# Patient Record
Sex: FEMALE | Race: WHITE | NOT HISPANIC OR LATINO | Employment: FULL TIME | URBAN - METROPOLITAN AREA
[De-identification: names, ages, dates, MRNs, and addresses within clinical notes are randomized per-mention and may not be internally consistent; named-entity substitution may affect disease eponyms.]

---

## 2017-08-04 ENCOUNTER — OFFICE VISIT (OUTPATIENT)
Dept: PRIMARY CARE CLINIC | Facility: CLINIC | Age: 37
End: 2017-08-04
Payer: MEDICAID

## 2017-08-04 VITALS
BODY MASS INDEX: 48.82 KG/M2 | WEIGHT: 293 LBS | OXYGEN SATURATION: 96 % | SYSTOLIC BLOOD PRESSURE: 163 MMHG | TEMPERATURE: 98 F | DIASTOLIC BLOOD PRESSURE: 93 MMHG | HEART RATE: 68 BPM | HEIGHT: 65 IN | RESPIRATION RATE: 18 BRPM

## 2017-08-04 DIAGNOSIS — S29.019A THORACIC MYOFASCIAL STRAIN, INITIAL ENCOUNTER: Primary | ICD-10-CM

## 2017-08-04 DIAGNOSIS — R81 GLYCOSURIA: ICD-10-CM

## 2017-08-04 DIAGNOSIS — M54.9 CVA TENDERNESS: ICD-10-CM

## 2017-08-04 PROCEDURE — 3008F BODY MASS INDEX DOCD: CPT | Mod: S$GLB,,, | Performed by: FAMILY MEDICINE

## 2017-08-04 PROCEDURE — 99203 OFFICE O/P NEW LOW 30 MIN: CPT | Mod: S$GLB,,, | Performed by: FAMILY MEDICINE

## 2017-08-04 RX ORDER — KETOCONAZOLE 20 MG/ML
1 SHAMPOO, SUSPENSION TOPICAL
Qty: 120 ML | Refills: 5 | Status: SHIPPED | OUTPATIENT
Start: 2017-08-04 | End: 2018-01-16 | Stop reason: SDUPTHER

## 2017-08-04 RX ORDER — METFORMIN HYDROCHLORIDE 1000 MG/1
1000 TABLET ORAL 2 TIMES DAILY WITH MEALS
Qty: 60 TABLET | Refills: 5 | Status: SHIPPED | OUTPATIENT
Start: 2017-08-04 | End: 2018-01-16 | Stop reason: SDUPTHER

## 2017-08-04 RX ORDER — METFORMIN HYDROCHLORIDE 1000 MG/1
1000 TABLET ORAL 2 TIMES DAILY WITH MEALS
COMMUNITY
End: 2017-08-04 | Stop reason: SDUPTHER

## 2017-08-04 RX ORDER — DICLOFENAC SODIUM 50 MG/1
50 TABLET, DELAYED RELEASE ORAL 2 TIMES DAILY
Qty: 20 TABLET | Refills: 0 | Status: SHIPPED | OUTPATIENT
Start: 2017-08-04 | End: 2017-08-14

## 2017-08-04 RX ORDER — KETOCONAZOLE 20 MG/ML
1 SHAMPOO, SUSPENSION TOPICAL
COMMUNITY
End: 2017-08-04 | Stop reason: SDUPTHER

## 2017-08-04 RX ORDER — GLIPIZIDE 5 MG/1
5 TABLET, FILM COATED, EXTENDED RELEASE ORAL
Qty: 30 TABLET | Refills: 5 | Status: SHIPPED | OUTPATIENT
Start: 2017-08-04 | End: 2018-01-16 | Stop reason: SDUPTHER

## 2017-08-04 RX ORDER — CYCLOBENZAPRINE HCL 10 MG
10 TABLET ORAL 3 TIMES DAILY PRN
Qty: 30 TABLET | Refills: 1 | Status: SHIPPED | OUTPATIENT
Start: 2017-08-04 | End: 2019-03-08

## 2017-08-04 RX ORDER — HYDROCODONE BITARTRATE AND ACETAMINOPHEN 5; 325 MG/1; MG/1
1 TABLET ORAL EVERY 6 HOURS PRN
Qty: 30 TABLET | Refills: 0 | Status: SHIPPED | OUTPATIENT
Start: 2017-08-04 | End: 2018-04-06

## 2017-08-04 RX ORDER — GLIPIZIDE 5 MG/1
5 TABLET, FILM COATED, EXTENDED RELEASE ORAL
COMMUNITY
End: 2017-08-04 | Stop reason: SDUPTHER

## 2017-08-04 RX ORDER — GABAPENTIN 300 MG/1
300 CAPSULE ORAL 2 TIMES DAILY
COMMUNITY
End: 2018-01-16 | Stop reason: SDUPTHER

## 2017-08-04 RX ORDER — LOSARTAN POTASSIUM AND HYDROCHLOROTHIAZIDE 12.5; 1 MG/1; MG/1
1 TABLET ORAL DAILY
COMMUNITY
End: 2017-08-04 | Stop reason: SDUPTHER

## 2017-08-04 RX ORDER — LOSARTAN POTASSIUM AND HYDROCHLOROTHIAZIDE 12.5; 1 MG/1; MG/1
1 TABLET ORAL DAILY
Qty: 30 TABLET | Refills: 5 | Status: SHIPPED | OUTPATIENT
Start: 2017-08-04 | End: 2018-01-16 | Stop reason: SDUPTHER

## 2017-08-04 RX ORDER — FUROSEMIDE 20 MG/1
20 TABLET ORAL DAILY PRN
COMMUNITY
End: 2018-01-16 | Stop reason: SDUPTHER

## 2017-08-04 NOTE — TELEPHONE ENCOUNTER
----- Message from Parish Ralph sent at 8/4/2017  2:38 PM CDT -----  Contact: same  Patient called in and stated she went to  her Rx's at her pharmacy and stated that 4 were not sent over:    Metformin 1000 mg  Losartan  Glipizide   Nizoral Shampoo (2%)      [facility] [address] [place] [address] [address] GARZA  [address] AT [facility] [place] [place] of [address]  [address] [address]  Phone: [phone] Fax: [phone]    Patient call back number is [phone]

## 2017-08-04 NOTE — PROGRESS NOTES
"Subjective:       Patient ID: Jessi Israel is a 37 y.o. female.    Chief Complaint: Back Pain (in the middle of pts back. pt states the pain feels like a "charley horse" went to ER, says nothing is wrong )    Back Pain   This is a new problem. The current episode started in the past 7 days. The problem occurs constantly. The problem has been gradually worsening since onset. The pain is present in the thoracic spine and costovertebral angle. The quality of the pain is described as shooting. Radiates to: left flank. The pain is at a severity of 8/10. The pain is severe. The symptoms are aggravated by position. Stiffness is present all day. Pertinent negatives include no chest pain, dysuria or fever. She has tried heat, ice, muscle relaxant and NSAIDs (Went to ER yesterday, had U/A and CT (negative) and shot of Toradol without relief) for the symptoms. The treatment provided no relief.     Review of Systems   Constitutional: Negative for chills and fever.   Respiratory: Negative for cough and shortness of breath.    Cardiovascular: Negative for chest pain.   Gastrointestinal: Positive for nausea. Negative for vomiting.   Genitourinary: Negative for difficulty urinating, dysuria and hematuria.   Musculoskeletal: Positive for back pain.       Objective:      Physical Exam   Constitutional: She is oriented to person, place, and time. She appears well-developed and well-nourished.   HENT:   Head: Normocephalic and atraumatic.   Nose: Nose normal.   Mouth/Throat: Mucous membranes are normal.   Eyes: EOM are normal. Pupils are equal, round, and reactive to light.   Neck: Neck supple. No JVD present.   Cardiovascular: Normal rate, regular rhythm, normal heart sounds and normal pulses.    Pulmonary/Chest: Effort normal and breath sounds normal.   Abdominal: Soft. Normal appearance and bowel sounds are normal. She exhibits no distension. There is CVA tenderness.   Left CVA tenderness   Musculoskeletal:        Thoracic back: " She exhibits tenderness and spasm. She exhibits no bony tenderness, no swelling and no deformity.        Back:    Neurological: She is alert and oriented to person, place, and time.   Skin: Skin is warm and dry.   Psychiatric: She has a normal mood and affect. Her speech is normal.   Vitals reviewed.      Assessment:       1. Thoracic myofascial strain, initial encounter    2. CVA tenderness    3. Glycosuria        Plan:       Thoracic myofascial strain, initial encounter  Comments:  [facility] ER records reviewed, renal stone protocol CT reviewed (no acute pathology). Rest, alternate ice & heat, use prn analgesics. F/U in 1 week if not improving.  Orders:  -     diclofenac (VOLTAREN) 50 MG EC tablet; Take 1 tablet (50 mg total) by mouth 2 (two) times daily.  Dispense: 20 tablet; Refill: 0  -     cyclobenzaprine (FLEXERIL) 10 MG tablet; Take 1 tablet (10 mg total) by mouth 3 (three) times daily as needed for Muscle spasms.  Dispense: 30 tablet; Refill: 1  -     hydrocodone-acetaminophen 5-325mg (NORCO) 5-325 mg per tablet; Take 1 tablet by mouth every 6 (six) hours as needed for Pain.  Dispense: 30 tablet; Refill: 0    CVA tenderness  Comments:   infection unlikely  Orders:  -     POCT urinalysis, dipstick or tablet reag  -     CULTURE, URINE    Glycosuria  -     CULTURE, URINE

## 2017-08-09 ENCOUNTER — TELEPHONE (OUTPATIENT)
Dept: PRIMARY CARE CLINIC | Facility: CLINIC | Age: 37
End: 2017-08-09

## 2017-08-09 DIAGNOSIS — N39.0 URINARY TRACT INFECTION DUE TO ENTEROCOCCUS: Primary | ICD-10-CM

## 2017-08-09 DIAGNOSIS — B95.2 URINARY TRACT INFECTION DUE TO ENTEROCOCCUS: Primary | ICD-10-CM

## 2017-08-09 RX ORDER — CIPROFLOXACIN 500 MG/1
500 TABLET ORAL 2 TIMES DAILY
Qty: 14 TABLET | Refills: 0 | Status: SHIPPED | OUTPATIENT
Start: 2017-08-09 | End: 2017-08-16

## 2017-08-10 NOTE — TELEPHONE ENCOUNTER
Notified pt urine culture came back with a bacteria (enterococcus) and Dr Alas send over an antibiotic. Pt states understanding

## 2018-01-09 ENCOUNTER — TELEPHONE (OUTPATIENT)
Dept: PRIMARY CARE CLINIC | Facility: CLINIC | Age: 38
End: 2018-01-09

## 2018-01-09 RX ORDER — FLUCONAZOLE 150 MG/1
150 TABLET ORAL ONCE
Qty: 1 TABLET | Refills: 0 | Status: SHIPPED | OUTPATIENT
Start: 2018-01-09 | End: 2018-01-09

## 2018-01-09 NOTE — TELEPHONE ENCOUNTER
----- Message from Mag Merchant sent at 1/9/2018  8:53 AM CST -----  Contact: self   Patient needs some diflucan called in for yeast infection       [id number] - [place] - [address] AT [facility] [place] of [address]  [address] [address]  Phone: [phone] Fax: [phone]

## 2018-01-16 ENCOUNTER — OFFICE VISIT (OUTPATIENT)
Dept: PRIMARY CARE CLINIC | Facility: CLINIC | Age: 38
End: 2018-01-16
Payer: MEDICAID

## 2018-01-16 ENCOUNTER — TELEPHONE (OUTPATIENT)
Dept: PRIMARY CARE CLINIC | Facility: CLINIC | Age: 38
End: 2018-01-16

## 2018-01-16 VITALS
TEMPERATURE: 98 F | HEIGHT: 65 IN | HEART RATE: 69 BPM | SYSTOLIC BLOOD PRESSURE: 141 MMHG | BODY MASS INDEX: 46.26 KG/M2 | DIASTOLIC BLOOD PRESSURE: 94 MMHG | OXYGEN SATURATION: 98 % | WEIGHT: 277.63 LBS | RESPIRATION RATE: 20 BRPM

## 2018-01-16 DIAGNOSIS — I10 ESSENTIAL HYPERTENSION: ICD-10-CM

## 2018-01-16 DIAGNOSIS — E11.9 TYPE 2 DIABETES MELLITUS WITHOUT COMPLICATION, WITHOUT LONG-TERM CURRENT USE OF INSULIN: ICD-10-CM

## 2018-01-16 DIAGNOSIS — R53.83 FATIGUE, UNSPECIFIED TYPE: ICD-10-CM

## 2018-01-16 DIAGNOSIS — J01.90 ACUTE BACTERIAL SINUSITIS: Primary | ICD-10-CM

## 2018-01-16 DIAGNOSIS — B96.89 ACUTE BACTERIAL SINUSITIS: Primary | ICD-10-CM

## 2018-01-16 DIAGNOSIS — Z13.220 LIPID SCREENING: ICD-10-CM

## 2018-01-16 PROCEDURE — 99213 OFFICE O/P EST LOW 20 MIN: CPT | Mod: PBBFAC,PN | Performed by: NURSE PRACTITIONER

## 2018-01-16 PROCEDURE — 99999 PR PBB SHADOW E&M-EST. PATIENT-LVL III: CPT | Mod: PBBFAC,,, | Performed by: NURSE PRACTITIONER

## 2018-01-16 PROCEDURE — 99214 OFFICE O/P EST MOD 30 MIN: CPT | Mod: S$PBB,,, | Performed by: NURSE PRACTITIONER

## 2018-01-16 RX ORDER — FUROSEMIDE 20 MG/1
20 TABLET ORAL DAILY PRN
Qty: 30 TABLET | Refills: 3 | Status: SHIPPED | OUTPATIENT
Start: 2018-01-16 | End: 2019-03-08

## 2018-01-16 RX ORDER — LOSARTAN POTASSIUM AND HYDROCHLOROTHIAZIDE 12.5; 1 MG/1; MG/1
1 TABLET ORAL DAILY
Qty: 30 TABLET | Refills: 3 | Status: SHIPPED | OUTPATIENT
Start: 2018-01-16 | End: 2019-03-08 | Stop reason: SDUPTHER

## 2018-01-16 RX ORDER — KETOCONAZOLE 20 MG/ML
1 SHAMPOO, SUSPENSION TOPICAL
Qty: 120 ML | Refills: 5 | Status: SHIPPED | OUTPATIENT
Start: 2018-01-17 | End: 2019-03-08

## 2018-01-16 RX ORDER — GABAPENTIN 300 MG/1
300 CAPSULE ORAL 2 TIMES DAILY
Qty: 60 CAPSULE | Refills: 3 | Status: SHIPPED | OUTPATIENT
Start: 2018-01-16 | End: 2018-04-06

## 2018-01-16 RX ORDER — GLIPIZIDE 5 MG/1
5 TABLET, FILM COATED, EXTENDED RELEASE ORAL
Qty: 30 TABLET | Refills: 3 | Status: SHIPPED | OUTPATIENT
Start: 2018-01-16 | End: 2019-03-08

## 2018-01-16 RX ORDER — METFORMIN HYDROCHLORIDE 1000 MG/1
1000 TABLET ORAL 2 TIMES DAILY WITH MEALS
Qty: 60 TABLET | Refills: 3 | Status: SHIPPED | OUTPATIENT
Start: 2018-01-16 | End: 2019-03-08

## 2018-01-16 RX ORDER — PROMETHAZINE HYDROCHLORIDE AND DEXTROMETHORPHAN HYDROBROMIDE 6.25; 15 MG/5ML; MG/5ML
5 SYRUP ORAL
Qty: 180 ML | Refills: 0 | Status: SHIPPED | OUTPATIENT
Start: 2018-01-16 | End: 2018-01-26

## 2018-01-16 RX ORDER — AMOXICILLIN AND CLAVULANATE POTASSIUM 875; 125 MG/1; MG/1
1 TABLET, FILM COATED ORAL EVERY 12 HOURS
Qty: 14 TABLET | Refills: 0 | Status: SHIPPED | OUTPATIENT
Start: 2018-01-16 | End: 2018-01-26

## 2018-01-16 NOTE — TELEPHONE ENCOUNTER
----- Message from Johnna Kathleen sent at 1/16/2018  9:44 AM CST -----  Contact: Patient  Jessi, patient cell [phone] or work [phone], Calling for same day appointment, chest congestion, feels like she can't catch a breath. Please advise. Thanks.

## 2018-01-16 NOTE — PROGRESS NOTES
Chief Complaint  Chief Complaint   Patient presents with    Cough    Sore Throat       HPI  Jessi Israel is a 37 y.o. female with medical diagnoses as listed within the medical history and problem list that presents for shortness of breath, cough, sore throat, congestion.  Patient is known to me with her last appointment. Patient reports the onset of symptoms 3 weeks ago with recent worsening. Fever and chills. Right-sided facial pain, right ear pain, right teeth pain. No n/v/d. Patient due for routine physical and lab work with dr. Alas. Reports she has been out of her medication for the past month and would like refills. Patient with h/o DM occasionally checking BG at home ranging 100-150s. Denies SE numbness, pain. Last eye exam 1 year ago. Patient with h/o HTN not currently on meds. Reports headaches and occasional blurred vision particularly when her pressure is high. Denies chest pain, palpitations.     PAST MEDICAL HISTORY:  Past Medical History:   Diagnosis Date    Arthritis     Diabetes mellitus, type 2     Hypertension        PAST SURGICAL HISTORY:  Past Surgical History:   Procedure Laterality Date    HERNIA REPAIR  2006    TONSILLECTOMY  1981    TUBAL LIGATION  2004       SOCIAL HISTORY:  Social History     Social History    Marital status: Single     Spouse name: N/A    Number of children: N/A    Years of education: N/A     Occupational History    Not on file.     Social History Main Topics    Smoking status: Current Every Day Smoker     Packs/day: 1.00     Types: Cigarettes    Smokeless tobacco: Never Used    Alcohol use Yes      Comment: socially    Drug use: No    Sexual activity: Not on file     Other Topics Concern    Not on file     Social History Narrative    No narrative on file       FAMILY HISTORY:  Family History   Problem Relation Age of Onset    Diabetes Mother     Hypertension Mother     Lupus Mother     Heart disease Mother     Hyperlipidemia Mother         ALLERGIES AND MEDICATIONS: updated and reviewed.  Review of patient's allergies indicates:  No Known Allergies  Current Outpatient Prescriptions   Medication Sig Dispense Refill    cyclobenzaprine (FLEXERIL) 10 MG tablet Take 1 tablet (10 mg total) by mouth 3 (three) times daily as needed for Muscle spasms. 30 tablet 1    furosemide (LASIX) 20 MG tablet Take 1 tablet (20 mg total) by mouth daily as needed. 30 tablet 3    glipiZIDE (GLUCOTROL) 5 MG TR24 Take 1 tablet (5 mg total) by mouth daily with breakfast. 30 tablet 3    [START ON 1/17/2018] ketoconazole (NIZORAL) 2 % shampoo Apply 1 application topically 3 (three) times a week. 120 mL 5    losartan-hydrochlorothiazide 100-12.5 mg (HYZAAR) 100-12.5 mg Tab Take 1 tablet by mouth once daily. 30 tablet 3    metFORMIN (GLUCOPHAGE) 1000 MG tablet Take 1 tablet (1,000 mg total) by mouth 2 (two) times daily with meals. 60 tablet 3    amoxicillin-clavulanate 875-125mg (AUGMENTIN) 875-125 mg per tablet Take 1 tablet by mouth every 12 (twelve) hours. 14 tablet 0    gabapentin (NEURONTIN) 300 MG capsule Take 1 capsule (300 mg total) by mouth 2 (two) times daily. 60 capsule 3    hydrocodone-acetaminophen 5-325mg (NORCO) 5-325 mg per tablet Take 1 tablet by mouth every 6 (six) hours as needed for Pain. 30 tablet 0    promethazine-dextromethorphan (PROMETHAZINE-DM) 6.25-15 mg/5 mL Syrp Take 5 mLs by mouth every 4 to 6 hours as needed. 180 mL 0     No current facility-administered medications for this visit.          ROS  Review of Systems   Constitutional: Positive for chills, fatigue and fever.   HENT: Positive for congestion, ear pain, postnasal drip, rhinorrhea, sinus pain, sinus pressure and sore throat.    Eyes: Positive for visual disturbance.   Respiratory: Positive for cough and shortness of breath. Negative for wheezing.    Cardiovascular: Negative for chest pain and palpitations.   Gastrointestinal: Positive for diarrhea. Negative for abdominal pain,  "nausea and vomiting.   Genitourinary: Negative for dysuria, frequency and urgency.   Musculoskeletal: Negative for arthralgias and joint swelling.   Skin: Negative for rash and wound.   Neurological: Positive for headaches. Negative for dizziness and weakness.   Psychiatric/Behavioral: Positive for sleep disturbance. Negative for dysphoric mood. The patient is not nervous/anxious.            PHYSICAL EXAM  Vitals:    01/16/18 1455   BP: (!) 141/94   BP Location: Right arm   Patient Position: Sitting   BP Method: Large (Automatic)   Pulse: 69   Resp: 20   Temp: 98 °F (36.7 °C)   TempSrc: Oral   SpO2: 98%   Weight: 125.9 kg (277 lb 9.6 oz)   Height: 5' 5" (1.651 m)    Body mass index is 46.2 kg/m².  Weight: 125.9 kg (277 lb 9.6 oz)   Height: 5' 5" (165.1 cm)       Physical Exam   Constitutional: She is oriented to person, place, and time. She appears well-developed and well-nourished.   HENT:   Head: Normocephalic.   Right Ear: Tympanic membrane normal.   Left Ear: Tympanic membrane normal.   Nose: Right sinus exhibits frontal sinus tenderness. Left sinus exhibits frontal sinus tenderness.   Mouth/Throat: Uvula is midline, oropharynx is clear and moist and mucous membranes are normal. Tonsils are 0 on the right. Tonsils are 0 on the left.   Eyes: Conjunctivae are normal.   Cardiovascular: Normal rate, regular rhythm, normal heart sounds and normal pulses.    No murmur heard.  Pulses:       Radial pulses are 2+ on the right side, and 2+ on the left side.   Pulmonary/Chest: Effort normal and breath sounds normal. She has no wheezes.   Abdominal: Soft. Bowel sounds are normal. There is no tenderness.   Musculoskeletal: She exhibits no edema.   Lymphadenopathy:     She has no cervical adenopathy.   Neurological: She is alert and oriented to person, place, and time.   Skin: Skin is warm and dry. No rash noted.   Psychiatric: She has a normal mood and affect.         Health Maintenance       Date Due Completion Date    " Lipid Panel 1978 ---    Hemoglobin A1c 1978 ---    Foot Exam 07/08/1988 ---    Eye Exam 07/08/1988 ---    TETANUS VACCINE 07/08/1996 ---    Pneumococcal PPSV23 (Medium Risk) (1) 07/08/1996 ---    Pap Smear with HPV Cotest 07/08/1999 ---    Influenza Vaccine 08/01/2017 ---               Assessment & Plan    Jessi was seen today for cough and sore throat.    Diagnoses and all orders for this visit:    Acute bacterial sinusitis  -     amoxicillin-clavulanate 875-125mg (AUGMENTIN) 875-125 mg per tablet; Take 1 tablet by mouth every 12 (twelve) hours.    Essential hypertension  -     losartan-hydrochlorothiazide 100-12.5 mg (HYZAAR) 100-12.5 mg Tab; Take 1 tablet by mouth once daily.  -     furosemide (LASIX) 20 MG tablet; Take 1 tablet (20 mg total) by mouth daily as needed.  -     CBC auto differential; Future  -     Comprehensive metabolic panel; Future    Type 2 diabetes mellitus without complication, without long-term current use of insulin  -     metFORMIN (GLUCOPHAGE) 1000 MG tablet; Take 1 tablet (1,000 mg total) by mouth 2 (two) times daily with meals.  -     glipiZIDE (GLUCOTROL) 5 MG TR24; Take 1 tablet (5 mg total) by mouth daily with breakfast.  -     Hemoglobin A1c; Future  -     Microalbumin/creatinine urine ratio; Future  -     TSH; Future  -     T4, free; Future  -     T3; Future    Fatigue, unspecified type    Lipid screening  -     Lipid panel; Future    Other orders  -     ketoconazole (NIZORAL) 2 % shampoo; Apply 1 application topically 3 (three) times a week.  -     gabapentin (NEURONTIN) 300 MG capsule; Take 1 capsule (300 mg total) by mouth 2 (two) times daily.  -     promethazine-dextromethorphan (PROMETHAZINE-DM) 6.25-15 mg/5 mL Syrp; Take 5 mLs by mouth every 4 to 6 hours as needed.          Follow-up: Follow-up if symptoms worsen or fail to improve, for 3 months for lab work / Dr. Alas.

## 2018-01-29 ENCOUNTER — TELEPHONE (OUTPATIENT)
Dept: PRIMARY CARE CLINIC | Facility: CLINIC | Age: 38
End: 2018-01-29

## 2018-01-29 RX ORDER — FLUCONAZOLE 150 MG/1
150 TABLET ORAL ONCE
Qty: 1 TABLET | Refills: 0 | Status: SHIPPED | OUTPATIENT
Start: 2018-01-29 | End: 2018-01-29

## 2018-01-29 NOTE — TELEPHONE ENCOUNTER
----- Message from Blanca Amezquita sent at 1/29/2018  3:13 PM CST -----  Contact: Patient  Patient states that she would like a prescription called in for her.  The medication is Diflucan for her yeast infection because of the antibiotics that she is taking.  Can you please call the patient back at [phone].  Thank you      [email or web address] [address] [place] [address] [address]AR[address] [facility] [address] AT [facility] of [address]  [place]  [address] [address]  Phone: [phone] Fax: [phone]

## 2018-04-06 ENCOUNTER — OFFICE VISIT (OUTPATIENT)
Dept: PRIMARY CARE CLINIC | Facility: CLINIC | Age: 38
End: 2018-04-06
Payer: MEDICAID

## 2018-04-06 VITALS
SYSTOLIC BLOOD PRESSURE: 145 MMHG | DIASTOLIC BLOOD PRESSURE: 81 MMHG | HEART RATE: 84 BPM | OXYGEN SATURATION: 95 % | HEIGHT: 65 IN | RESPIRATION RATE: 18 BRPM | TEMPERATURE: 98 F | WEIGHT: 293 LBS | BODY MASS INDEX: 48.82 KG/M2

## 2018-04-06 DIAGNOSIS — K52.9 GASTROENTERITIS: Primary | ICD-10-CM

## 2018-04-06 PROBLEM — E66.01 MORBID OBESITY WITH BMI OF 50.0-59.9, ADULT: Status: ACTIVE | Noted: 2018-04-06

## 2018-04-06 PROCEDURE — 99999 PR PBB SHADOW E&M-EST. PATIENT-LVL III: CPT | Mod: PBBFAC,,, | Performed by: FAMILY MEDICINE

## 2018-04-06 PROCEDURE — 99213 OFFICE O/P EST LOW 20 MIN: CPT | Mod: PBBFAC,PN | Performed by: FAMILY MEDICINE

## 2018-04-06 PROCEDURE — 99213 OFFICE O/P EST LOW 20 MIN: CPT | Mod: S$PBB,,, | Performed by: FAMILY MEDICINE

## 2018-04-06 RX ORDER — ONDANSETRON 4 MG/1
8 TABLET, ORALLY DISINTEGRATING ORAL EVERY 6 HOURS PRN
Qty: 20 TABLET | Refills: 1 | Status: SHIPPED | OUTPATIENT
Start: 2018-04-06 | End: 2019-03-08

## 2018-04-06 NOTE — PROGRESS NOTES
"Subjective:       Patient ID: Jessi Israel is a 37 y.o. female.    Chief Complaint: Nausea (x3 days) and Abdominal Cramping (x3 days)    Crampy abdominal pain, nausea, vomiting and nonbloody diarrhea x 3 days. Low-grade fever first day, none since. No longer vomiting, but still nauseated and having diarrhea. No known sick contacts      Review of Systems   Constitutional: Positive for fever.   Respiratory: Negative for shortness of breath.    Cardiovascular: Negative for chest pain.   Gastrointestinal: Positive for diarrhea, nausea and vomiting. Negative for blood in stool.       Objective:      Vitals:    04/06/18 1507   BP: (!) 145/81   BP Location: Left arm   Patient Position: Sitting   BP Method: Large (Automatic)   Pulse: 84   Resp: 18   Temp: 98.3 °F (36.8 °C)   TempSrc: Oral   SpO2: 95%   Weight: (!) 146.5 kg (323 lb)   Height: 5' 5" (1.651 m)     Physical Exam   Constitutional: She is oriented to person, place, and time. She appears well-developed and well-nourished.   HENT:   Head: Normocephalic and atraumatic.   Mouth/Throat: Oropharynx is clear and moist and mucous membranes are normal.   Neck: Neck supple. No JVD present.   Cardiovascular: Normal rate, regular rhythm and normal heart sounds.    Pulmonary/Chest: Effort normal and breath sounds normal.   Abdominal: Soft. Bowel sounds are normal. She exhibits no distension and no mass. There is tenderness in the epigastric area. There is no guarding.   Musculoskeletal: She exhibits no edema.   Neurological: She is alert and oriented to person, place, and time.   Skin: Skin is warm and dry.   Nursing note and vitals reviewed.      Assessment:       1. Gastroenteritis        Plan:       Gastroenteritis  Comments:  likely viral, treat symptomatically  Orders:  -     ranitidine (ZANTAC) 150 MG tablet; Take 1 tablet (150 mg total) by mouth 2 (two) times daily.  Dispense: 14 tablet; Refill: 0  -     ondansetron (ZOFRAN ODT) 4 MG TbDL; Take 2 tablets (8 mg " total) by mouth every 6 (six) hours as needed (nausea).  Dispense: 20 tablet; Refill: 1      Medication List with Changes/Refills   New Medications    ONDANSETRON (ZOFRAN ODT) 4 MG TBDL    Take 2 tablets (8 mg total) by mouth every 6 (six) hours as needed (nausea).    RANITIDINE (ZANTAC) 150 MG TABLET    Take 1 tablet (150 mg total) by mouth 2 (two) times daily.   Current Medications    CYCLOBENZAPRINE (FLEXERIL) 10 MG TABLET    Take 1 tablet (10 mg total) by mouth 3 (three) times daily as needed for Muscle spasms.    FUROSEMIDE (LASIX) 20 MG TABLET    Take 1 tablet (20 mg total) by mouth daily as needed.    GLIPIZIDE (GLUCOTROL) 5 MG TR24    Take 1 tablet (5 mg total) by mouth daily with breakfast.    KETOCONAZOLE (NIZORAL) 2 % SHAMPOO    Apply 1 application topically 3 (three) times a week.    LOSARTAN-HYDROCHLOROTHIAZIDE 100-12.5 MG (HYZAAR) 100-12.5 MG TAB    Take 1 tablet by mouth once daily.    METFORMIN (GLUCOPHAGE) 1000 MG TABLET    Take 1 tablet (1,000 mg total) by mouth 2 (two) times daily with meals.   Discontinued Medications    GABAPENTIN (NEURONTIN) 300 MG CAPSULE    Take 1 capsule (300 mg total) by mouth 2 (two) times daily.    HYDROCODONE-ACETAMINOPHEN 5-325MG (NORCO) 5-325 MG PER TABLET    Take 1 tablet by mouth every 6 (six) hours as needed for Pain.

## 2018-07-19 ENCOUNTER — TELEPHONE (OUTPATIENT)
Dept: PRIMARY CARE CLINIC | Facility: CLINIC | Age: 38
End: 2018-07-19

## 2018-07-19 NOTE — TELEPHONE ENCOUNTER
----- Message from Gaby Carbone sent at 7/19/2018 12:03 PM CDT -----  Contact: Patient  Type: Needs Medical Advice    Who Called:  Patient  Symptoms (please be specific):  Vomiting and diarrhea  How long has patient had these symptoms:  Since yesterday  Pharmacy name and phone #:    [facility] [id number] - [place] - [address] AT [facility] [address]  [address] [address]  Phone: [phone] Fax: [phone]  Best Call Back Number: [phone]  Additional Information:

## 2018-07-19 NOTE — TELEPHONE ENCOUNTER
patient notified that Dr. Alas is out of the office. She says she will try   OTC meds first and call back if she needs us.

## 2019-02-22 DIAGNOSIS — Z12.39 BREAST CANCER SCREENING: ICD-10-CM

## 2019-02-26 ENCOUNTER — TELEPHONE (OUTPATIENT)
Dept: PRIMARY CARE CLINIC | Facility: CLINIC | Age: 39
End: 2019-02-26

## 2019-02-26 NOTE — TELEPHONE ENCOUNTER
----- Message from Sherrill Sims sent at 2/26/2019  4:46 PM CST -----  Contact: Jessi  Type:  Same Day Appointment Request    Caller is requesting a same day appointment.  Caller declined first available appointment listed below.      Name of Caller:  patient  When is the first available appointment?  3/18/19  Symptoms:  Cough/congestion/runny nose/ears stopped up  Best Call Back Number:  [phone]  Additional Information:   Patient would like to be seen as soon as possible--please advise--thank you

## 2019-02-26 NOTE — TELEPHONE ENCOUNTER
Spoke with patient states she needs to be seen soon for being sick. Advised her the first available is with Dr. Ko and that is on 3/11. States she needs to be seen asap because she can not go back to work if she has the flu. Advised her she can go to an urgent care or ER. States she will figure something out.

## 2019-03-11 ENCOUNTER — TELEPHONE (OUTPATIENT)
Dept: PRIMARY CARE CLINIC | Facility: CLINIC | Age: 39
End: 2019-03-11

## 2019-03-11 DIAGNOSIS — K64.9 HEMORRHOIDS, UNSPECIFIED HEMORRHOID TYPE: Primary | ICD-10-CM

## 2019-03-11 NOTE — TELEPHONE ENCOUNTER
----- Message from Meghna Mcdowell sent at 3/11/2019 12:46 PM CDT -----  Contact: self  Patient went to [facility] Emergency Room on 03 08 19 for hemorrhoids and she was advised to schedule an appt with Dr Sagar Martin for consult and possible surgery of the hemorrhoids/please enter a referral for patient to see Dr Martin/this is a Medicaid referral

## 2019-07-17 ENCOUNTER — OFFICE VISIT (OUTPATIENT)
Dept: PRIMARY CARE CLINIC | Facility: CLINIC | Age: 39
End: 2019-07-17
Payer: MEDICAID

## 2019-07-17 VITALS
HEIGHT: 65 IN | HEART RATE: 64 BPM | TEMPERATURE: 98 F | DIASTOLIC BLOOD PRESSURE: 108 MMHG | BODY MASS INDEX: 48.82 KG/M2 | RESPIRATION RATE: 18 BRPM | OXYGEN SATURATION: 97 % | WEIGHT: 293 LBS | SYSTOLIC BLOOD PRESSURE: 188 MMHG

## 2019-07-17 DIAGNOSIS — F17.210 CIGARETTE NICOTINE DEPENDENCE WITHOUT COMPLICATION: ICD-10-CM

## 2019-07-17 DIAGNOSIS — L21.0 DANDRUFF IN ADULT: ICD-10-CM

## 2019-07-17 DIAGNOSIS — Z13.6 ENCOUNTER FOR SCREENING FOR CARDIOVASCULAR DISORDERS: ICD-10-CM

## 2019-07-17 DIAGNOSIS — E66.01 MORBID OBESITY WITH BMI OF 50.0-59.9, ADULT: ICD-10-CM

## 2019-07-17 DIAGNOSIS — Z12.4 CERVICAL CANCER SCREENING: ICD-10-CM

## 2019-07-17 DIAGNOSIS — I10 ESSENTIAL HYPERTENSION: Primary | ICD-10-CM

## 2019-07-17 DIAGNOSIS — E11.65 TYPE 2 DIABETES MELLITUS WITH HYPERGLYCEMIA, WITHOUT LONG-TERM CURRENT USE OF INSULIN: ICD-10-CM

## 2019-07-17 PROBLEM — R73.03 PREDIABETES: Status: ACTIVE | Noted: 2018-01-16

## 2019-07-17 PROBLEM — Z86.39 HISTORY OF DIABETES MELLITUS: Status: ACTIVE | Noted: 2019-07-17

## 2019-07-17 LAB — GLUCOSE SERPL-MCNC: 383 MG/DL (ref 70–110)

## 2019-07-17 PROCEDURE — 99214 PR OFFICE/OUTPT VISIT, EST, LEVL IV, 30-39 MIN: ICD-10-PCS | Mod: S$PBB,,, | Performed by: FAMILY MEDICINE

## 2019-07-17 PROCEDURE — 99214 OFFICE O/P EST MOD 30 MIN: CPT | Mod: S$PBB,,, | Performed by: FAMILY MEDICINE

## 2019-07-17 PROCEDURE — 99999 PR PBB SHADOW E&M-EST. PATIENT-LVL V: CPT | Mod: PBBFAC,,, | Performed by: FAMILY MEDICINE

## 2019-07-17 PROCEDURE — 93005 ELECTROCARDIOGRAM TRACING: CPT | Mod: PBBFAC,PN | Performed by: INTERNAL MEDICINE

## 2019-07-17 PROCEDURE — 93010 EKG 12-LEAD: ICD-10-PCS | Mod: S$PBB,,, | Performed by: INTERNAL MEDICINE

## 2019-07-17 PROCEDURE — 93010 ELECTROCARDIOGRAM REPORT: CPT | Mod: S$PBB,,, | Performed by: INTERNAL MEDICINE

## 2019-07-17 PROCEDURE — 99215 OFFICE O/P EST HI 40 MIN: CPT | Mod: PBBFAC,PN,25 | Performed by: FAMILY MEDICINE

## 2019-07-17 PROCEDURE — 82962 GLUCOSE BLOOD TEST: CPT | Mod: PBBFAC,PN | Performed by: FAMILY MEDICINE

## 2019-07-17 PROCEDURE — 99999 PR PBB SHADOW E&M-EST. PATIENT-LVL V: ICD-10-PCS | Mod: PBBFAC,,, | Performed by: FAMILY MEDICINE

## 2019-07-17 RX ORDER — METFORMIN HYDROCHLORIDE 500 MG/1
1000 TABLET, EXTENDED RELEASE ORAL
Qty: 60 TABLET | Refills: 5 | Status: SHIPPED | OUTPATIENT
Start: 2019-07-17 | End: 2020-02-04 | Stop reason: CLARIF

## 2019-07-17 RX ORDER — LOSARTAN POTASSIUM AND HYDROCHLOROTHIAZIDE 12.5; 1 MG/1; MG/1
1 TABLET ORAL DAILY
Qty: 30 TABLET | Refills: 5 | Status: SHIPPED | OUTPATIENT
Start: 2019-07-17 | End: 2020-02-04 | Stop reason: CLARIF

## 2019-07-17 RX ORDER — KETOCONAZOLE 20 MG/ML
SHAMPOO, SUSPENSION TOPICAL
Qty: 120 ML | Refills: 1 | Status: SHIPPED | OUTPATIENT
Start: 2019-07-18 | End: 2020-02-04 | Stop reason: CLARIF

## 2019-07-17 NOTE — PROGRESS NOTES
"Subjective:       Patient ID: Jessi Israel is a 39 y.o. female.    Chief Complaint: Hypertension    Ran out of blood pressure medications a few months ago, has not been taking.  Has been having elevated blood pressure, occasional nonexertional chest tightness, frequent headaches.  Also been having increased urinary frequency, no dysuria.  Has a history of diabetes, says she has been off metformin for years, was no longer diabetic for the past few years.  Has not been checking her blood sugar or following any particular diet.  Also complains of some left breast tenderness for the past few weeks, thought she may be palpated a mass, but not entirely sure.  No nipple discharge, skin changes, etc.  No prior mammograms.    Review of Systems   Constitutional: Negative for chills, fatigue and fever.   HENT: Negative for congestion.    Eyes: Negative for visual disturbance.   Respiratory: Positive for chest tightness. Negative for cough and shortness of breath.    Cardiovascular: Negative for chest pain and palpitations.   Gastrointestinal: Negative for abdominal pain, nausea and vomiting.   Endocrine: Positive for polyuria.   Genitourinary: Negative for difficulty urinating and dysuria.   Musculoskeletal: Negative for arthralgias.   Skin: Negative for rash.   Neurological: Positive for headaches. Negative for dizziness.   Psychiatric/Behavioral: Negative for sleep disturbance.       Objective:      Vitals:    07/17/19 1530 07/17/19 1549   BP: (!) 191/120 (!) 188/108   BP Location: Right arm Right arm   Patient Position: Sitting Sitting   BP Method: Large (Automatic) Large (Manual)   Pulse: 64    Resp: 18    Temp: 98.4 °F (36.9 °C)    TempSrc: Oral    SpO2: 97%    Weight: (!) 145.6 kg (321 lb)    Height: 5' 5" (1.651 m)      Physical Exam   Constitutional: She is oriented to person, place, and time. She appears well-developed and well-nourished.   HENT:   Head: Normocephalic and atraumatic.   Eyes: Pupils are equal, " round, and reactive to light. EOM are normal.   Neck: Neck supple. No JVD present. Carotid bruit is not present.   Cardiovascular: Normal rate, regular rhythm and normal heart sounds.   Pulses:       Radial pulses are 2+ on the right side, and 2+ on the left side.   Pulmonary/Chest: Effort normal and breath sounds normal. Right breast exhibits no mass, no skin change and no tenderness. Left breast exhibits no mass, no skin change and no tenderness. No breast swelling, tenderness or discharge. Breasts are symmetrical.   Abdominal: Soft. Bowel sounds are normal. She exhibits no distension. There is no tenderness.   Musculoskeletal: She exhibits no edema.   Neurological: She is alert and oriented to person, place, and time.   Skin: Skin is warm and dry.   Psychiatric: She has a normal mood and affect. Her behavior is normal.   Nursing note and vitals reviewed.      Assessment:       1. Essential hypertension    2. Cervical cancer screening    3. Morbid obesity with BMI of 50.0-59.9, adult    4. Cigarette nicotine dependence without complication    5. Type 2 diabetes mellitus with hyperglycemia, without long-term current use of insulin    6. Encounter for screening for cardiovascular disorders    7. Seborrhea capitis        Plan:       Essential hypertension  -     EKG 12-lead  -     CBC auto differential; Future; Expected date: 07/17/2019  -     Comprehensive metabolic panel; Future; Expected date: 07/17/2019  -     losartan-hydrochlorothiazide 100-12.5 mg (HYZAAR) 100-12.5 mg Tab; Take 1 tablet by mouth once daily.  Dispense: 30 tablet; Refill: 5  EKG normal.  Restart antihypertensives, needs close follow-up  Cervical cancer screening  -     Ambulatory Referral to Gynecology    Morbid obesity with BMI of 50.0-59.9, adult  Low carb diet and exercise  Cigarette nicotine dependence without complication  -     Ambulatory referral to Smoking Cessation Program    Type 2 diabetes mellitus with hyperglycemia, without  long-term current use of insulin  -     POCT Glucose, Hand-Held Device  -     Hemoglobin A1c; Future; Expected date: 07/17/2019  -     TSH; Future; Expected date: 07/17/2019  -     Microalbumin/creatinine urine ratio; Future; Expected date: 07/17/2019  -     Ambulatory Referral to Diabetes Education  -     metFORMIN (GLUCOPHAGE-XR) 500 MG 24 hr tablet; Take 2 tablets (1,000 mg total) by mouth daily with breakfast.  Dispense: 60 tablet; Refill: 5  Start metformin, needs to follow up with diabetes educator.  Needs baseline labs  Encounter for screening for cardiovascular disorders  -     Lipid panel; Future; Expected date: 07/17/2019    Seborrhea capitis  -     ketoconazole (NIZORAL) 2 % shampoo; Apply topically twice a week.  Dispense: 120 mL; Refill: 1      Medication List with Changes/Refills   New Medications    KETOCONAZOLE (NIZORAL) 2 % SHAMPOO    Apply topically twice a week.    METFORMIN (GLUCOPHAGE-XR) 500 MG 24 HR TABLET    Take 2 tablets (1,000 mg total) by mouth daily with breakfast.   Changed and/or Refilled Medications    Modified Medication Previous Medication    LOSARTAN-HYDROCHLOROTHIAZIDE 100-12.5 MG (HYZAAR) 100-12.5 MG TAB losartan-hydrochlorothiazide 100-12.5 mg (HYZAAR) 100-12.5 mg Tab       Take 1 tablet by mouth once daily.    Take 1 tablet by mouth once daily.   Discontinued Medications    DOCUSATE SODIUM (COLACE) 100 MG CAPSULE    Take 1 capsule (100 mg total) by mouth 3 (three) times daily as needed for Constipation.    HYDROCORTISONE 1 % CREAM    Apply to affected area 2 times daily    ONDANSETRON (ZOFRAN) 4 MG TABLET    Take 1 tablet (4 mg total) by mouth every 6 (six) hours.

## 2019-10-03 ENCOUNTER — TELEPHONE (OUTPATIENT)
Dept: PRIMARY CARE CLINIC | Facility: CLINIC | Age: 39
End: 2019-10-03

## 2019-10-03 NOTE — TELEPHONE ENCOUNTER
Patient is long overdue for follow-up for diabetes.  Needs fasting labs and follow-up appointment.  Please call patient and schedule

## 2020-02-10 ENCOUNTER — PATIENT OUTREACH (OUTPATIENT)
Dept: ADMINISTRATIVE | Facility: HOSPITAL | Age: 40
End: 2020-02-10

## 2020-02-11 NOTE — PROGRESS NOTES
Attempted to contact patient to discuss/schedule follow up appointment and overdue HM.  Unable to LMOM d/t phone busy x 2 attempts.  Patient not active on portal for alternative outreach.

## 2020-04-22 ENCOUNTER — TELEPHONE (OUTPATIENT)
Dept: PRIMARY CARE CLINIC | Facility: CLINIC | Age: 40
End: 2020-04-22

## 2020-04-22 DIAGNOSIS — F17.210 CIGARETTE NICOTINE DEPENDENCE WITHOUT COMPLICATION: Primary | ICD-10-CM

## 2020-04-22 NOTE — TELEPHONE ENCOUNTER
----- Message from Rebecca Osman sent at 4/22/2020 11:36 AM CDT -----  Contact: Pt [phone]  Is this a refill or new RX:  Refill    RX name and strength: Nicotine TD 21mg patch     Pharmacy name and phone # [id number] - [place], [place] - [address] AT [facility] [address] [phone] (Phone)  [phone] (Fax)

## 2020-04-22 NOTE — TELEPHONE ENCOUNTER
patient says the smoking cessation program is not seeing patient right now. She is asking if you can send a rx for patches to the pharmacy

## 2020-04-23 NOTE — TELEPHONE ENCOUNTER
I spoke to Juan, he is doing audio visits.  Please sign referral. patient was notified they would be called, she verbalized understanding.

## 2020-05-14 DIAGNOSIS — Z12.39 BREAST CANCER SCREENING: ICD-10-CM

## 2021-12-06 PROBLEM — Z71.3 ENCOUNTER FOR DIETARY CONSULTATION: Status: ACTIVE | Noted: 2021-12-06

## 2021-12-06 PROBLEM — R55 SYNCOPE: Status: ACTIVE | Noted: 2021-12-06

## 2022-05-17 ENCOUNTER — PATIENT OUTREACH (OUTPATIENT)
Dept: ADMINISTRATIVE | Facility: HOSPITAL | Age: 42
End: 2022-05-17
Payer: MEDICAID

## 2022-05-17 NOTE — PROGRESS NOTES
Chart review done.  updated. Immunizations reviewed & updated. Care Everywhere updated.  I called the patient to see if she is still using Dr. Alas as her PCP and to scheduled her a annual. No answer l/m.

## 2023-07-16 ENCOUNTER — HOSPITAL ENCOUNTER (EMERGENCY)
Facility: HOSPITAL | Age: 43
Discharge: HOME | End: 2023-07-16
Attending: EMERGENCY MEDICINE
Payer: MEDICAID

## 2023-07-16 VITALS
OXYGEN SATURATION: 99 % | SYSTOLIC BLOOD PRESSURE: 189 MMHG | HEART RATE: 82 BPM | WEIGHT: 250 LBS | RESPIRATION RATE: 71 BRPM | DIASTOLIC BLOOD PRESSURE: 90 MMHG | BODY MASS INDEX: 41.6 KG/M2 | TEMPERATURE: 98 F

## 2023-07-16 DIAGNOSIS — R59.0 CERVICAL LYMPHADENOPATHY: Primary | ICD-10-CM

## 2023-07-16 DIAGNOSIS — E11.65 HYPERGLYCEMIA DUE TO DIABETES MELLITUS: ICD-10-CM

## 2023-07-16 DIAGNOSIS — E11.65 TYPE 2 DIABETES MELLITUS WITH HYPERGLYCEMIA, WITHOUT LONG-TERM CURRENT USE OF INSULIN: ICD-10-CM

## 2023-07-16 DIAGNOSIS — I10 POORLY-CONTROLLED HYPERTENSION: ICD-10-CM

## 2023-07-16 DIAGNOSIS — I10 HYPERTENSION: ICD-10-CM

## 2023-07-16 LAB
ALBUMIN SERPL BCP-MCNC: 3.8 G/DL (ref 3.5–5.2)
ALP SERPL-CCNC: 113 U/L (ref 38–126)
ALT SERPL W/O P-5'-P-CCNC: 36 U/L (ref 10–44)
ANION GAP SERPL CALC-SCNC: 9 MMOL/L (ref 8–16)
AST SERPL-CCNC: 36 U/L (ref 15–46)
B-HCG UR QL: NEGATIVE
BASOPHILS # BLD AUTO: 0.06 K/UL (ref 0–0.2)
BASOPHILS NFR BLD: 0.5 % (ref 0–1.9)
BILIRUB SERPL-MCNC: 0.3 MG/DL (ref 0.1–1)
CALCIUM SERPL-MCNC: 8.5 MG/DL (ref 8.7–10.5)
CHLORIDE SERPL-SCNC: 102 MMOL/L (ref 95–110)
CO2 SERPL-SCNC: 24 MMOL/L (ref 23–29)
CREAT SERPL-MCNC: 0.71 MG/DL (ref 0.5–1.4)
CTP QC/QA: YES
DIFFERENTIAL METHOD: ABNORMAL
EOSINOPHIL # BLD AUTO: 0.3 K/UL (ref 0–0.5)
EOSINOPHIL NFR BLD: 2.4 % (ref 0–8)
ERYTHROCYTE [DISTWIDTH] IN BLOOD BY AUTOMATED COUNT: 12.8 % (ref 11.5–14.5)
EST. GFR  (NO RACE VARIABLE): >60 ML/MIN/1.73 M^2
GLUCOSE SERPL-MCNC: 323 MG/DL (ref 70–110)
HCT VFR BLD AUTO: 41.4 % (ref 37–48.5)
HGB BLD-MCNC: 13.9 G/DL (ref 12–16)
IMM GRANULOCYTES # BLD AUTO: 0.02 K/UL (ref 0–0.04)
IMM GRANULOCYTES NFR BLD AUTO: 0.2 % (ref 0–0.5)
LYMPHOCYTES # BLD AUTO: 3.1 K/UL (ref 1–4.8)
LYMPHOCYTES NFR BLD: 25.2 % (ref 18–48)
MCH RBC QN AUTO: 29.2 PG (ref 27–31)
MCHC RBC AUTO-ENTMCNC: 33.6 G/DL (ref 32–36)
MCV RBC AUTO: 87 FL (ref 82–98)
MONOCYTES # BLD AUTO: 0.7 K/UL (ref 0.3–1)
MONOCYTES NFR BLD: 5.6 % (ref 4–15)
NEUTROPHILS # BLD AUTO: 8.1 K/UL (ref 1.8–7.7)
NEUTROPHILS NFR BLD: 66.1 % (ref 38–73)
NRBC BLD-RTO: 0 /100 WBC
PLATELET # BLD AUTO: 332 K/UL (ref 150–450)
PMV BLD AUTO: 10.8 FL (ref 9.2–12.9)
POCT GLUCOSE: 194 MG/DL (ref 70–110)
POCT GLUCOSE: 282 MG/DL (ref 70–110)
POTASSIUM SERPL-SCNC: 3.9 MMOL/L (ref 3.5–5.1)
PROT SERPL-MCNC: 7.2 G/DL (ref 6–8.4)
RBC # BLD AUTO: 4.76 M/UL (ref 4–5.4)
SODIUM SERPL-SCNC: 135 MMOL/L (ref 136–145)
TROPONIN I SERPL-MCNC: 0.01 NG/ML (ref 0.01–0.03)
UUN UR-MCNC: 15 MG/DL (ref 7–17)
WBC # BLD AUTO: 12.3 K/UL (ref 3.9–12.7)

## 2023-07-16 PROCEDURE — 96360 HYDRATION IV INFUSION INIT: CPT | Mod: 59,ER

## 2023-07-16 PROCEDURE — 84484 ASSAY OF TROPONIN QUANT: CPT | Mod: ER | Performed by: EMERGENCY MEDICINE

## 2023-07-16 PROCEDURE — 93010 EKG 12-LEAD: ICD-10-PCS | Mod: ,,, | Performed by: INTERNAL MEDICINE

## 2023-07-16 PROCEDURE — 25500020 PHARM REV CODE 255: Mod: ER | Performed by: EMERGENCY MEDICINE

## 2023-07-16 PROCEDURE — 93005 ELECTROCARDIOGRAM TRACING: CPT | Mod: ER

## 2023-07-16 PROCEDURE — 93010 ELECTROCARDIOGRAM REPORT: CPT | Mod: ,,, | Performed by: INTERNAL MEDICINE

## 2023-07-16 PROCEDURE — 25000003 PHARM REV CODE 250: Mod: ER | Performed by: EMERGENCY MEDICINE

## 2023-07-16 PROCEDURE — 99285 EMERGENCY DEPT VISIT HI MDM: CPT | Mod: 25,ER

## 2023-07-16 PROCEDURE — 80053 COMPREHEN METABOLIC PANEL: CPT | Mod: ER | Performed by: EMERGENCY MEDICINE

## 2023-07-16 PROCEDURE — 85025 COMPLETE CBC W/AUTO DIFF WBC: CPT | Mod: ER | Performed by: EMERGENCY MEDICINE

## 2023-07-16 PROCEDURE — 81025 URINE PREGNANCY TEST: CPT | Mod: ER | Performed by: EMERGENCY MEDICINE

## 2023-07-16 PROCEDURE — 99900035 HC TECH TIME PER 15 MIN (STAT): Mod: ER

## 2023-07-16 PROCEDURE — 82962 GLUCOSE BLOOD TEST: CPT | Mod: ER

## 2023-07-16 RX ORDER — KETOROLAC TROMETHAMINE 10 MG/1
10 TABLET, FILM COATED ORAL EVERY 8 HOURS PRN
Qty: 15 TABLET | Refills: 0 | Status: SHIPPED | OUTPATIENT
Start: 2023-07-16

## 2023-07-16 RX ORDER — TETRACAINE HYDROCHLORIDE 5 MG/ML
2 SOLUTION OPHTHALMIC
Status: COMPLETED | OUTPATIENT
Start: 2023-07-16 | End: 2023-07-16

## 2023-07-16 RX ORDER — LOSARTAN POTASSIUM AND HYDROCHLOROTHIAZIDE 25; 100 MG/1; MG/1
1 TABLET ORAL DAILY
Qty: 30 TABLET | Refills: 0 | Status: SHIPPED | OUTPATIENT
Start: 2023-07-16

## 2023-07-16 RX ORDER — AMOXICILLIN 500 MG/1
500 TABLET, FILM COATED ORAL EVERY 12 HOURS
Qty: 20 TABLET | Refills: 0 | Status: SHIPPED | OUTPATIENT
Start: 2023-07-16 | End: 2023-07-26

## 2023-07-16 RX ORDER — ONDANSETRON 4 MG/1
4 TABLET, ORALLY DISINTEGRATING ORAL
Status: COMPLETED | OUTPATIENT
Start: 2023-07-16 | End: 2023-07-16

## 2023-07-16 RX ORDER — AMOXICILLIN AND CLAVULANATE POTASSIUM 875; 125 MG/1; MG/1
1 TABLET, FILM COATED ORAL
Status: COMPLETED | OUTPATIENT
Start: 2023-07-16 | End: 2023-07-16

## 2023-07-16 RX ORDER — PROPARACAINE HYDROCHLORIDE 5 MG/ML
1 SOLUTION/ DROPS OPHTHALMIC
Status: DISCONTINUED | OUTPATIENT
Start: 2023-07-16 | End: 2023-07-16

## 2023-07-16 RX ORDER — KETOROLAC TROMETHAMINE 10 MG/1
10 TABLET, FILM COATED ORAL
Status: COMPLETED | OUTPATIENT
Start: 2023-07-16 | End: 2023-07-16

## 2023-07-16 RX ORDER — HYDROCODONE BITARTRATE AND ACETAMINOPHEN 5; 325 MG/1; MG/1
1 TABLET ORAL
Status: COMPLETED | OUTPATIENT
Start: 2023-07-16 | End: 2023-07-16

## 2023-07-16 RX ORDER — METFORMIN HYDROCHLORIDE 500 MG/1
500 TABLET ORAL 2 TIMES DAILY WITH MEALS
Qty: 60 TABLET | Refills: 0 | Status: SHIPPED | OUTPATIENT
Start: 2023-07-16

## 2023-07-16 RX ADMIN — HYDROCODONE BITARTRATE AND ACETAMINOPHEN 1 TABLET: 5; 325 TABLET ORAL at 08:07

## 2023-07-16 RX ADMIN — SODIUM CHLORIDE 1000 ML: 9 INJECTION, SOLUTION INTRAVENOUS at 09:07

## 2023-07-16 RX ADMIN — TETRACAINE HYDROCHLORIDE 2 DROP: 5 SOLUTION OPHTHALMIC at 08:07

## 2023-07-16 RX ADMIN — KETOROLAC TROMETHAMINE 10 MG: 10 TABLET, FILM COATED ORAL at 11:07

## 2023-07-16 RX ADMIN — ONDANSETRON 4 MG: 4 TABLET, ORALLY DISINTEGRATING ORAL at 08:07

## 2023-07-16 RX ADMIN — AMOXICILLIN AND CLAVULANATE POTASSIUM 1 TABLET: 875; 125 TABLET, FILM COATED ORAL at 11:07

## 2023-07-16 RX ADMIN — IOHEXOL 100 ML: 350 INJECTION, SOLUTION INTRAVENOUS at 09:07

## 2023-07-16 NOTE — Clinical Note
"Jessi Lin" Israel was seen and treated in our emergency department on 7/16/2023.  She may return to work on 07/18/2023.       If you have any questions or concerns, please don't hesitate to call.       RN    "

## 2023-07-16 NOTE — Clinical Note
Ignacio Noble accompanied their spouse to the emergency department on 7/16/2023. They may return to work on 07/18/2023.      If you have any questions or concerns, please don't hesitate to call.       RN

## 2023-07-17 NOTE — ED NOTES
Reviewed discharge instructions, Rx, and follow up with pt.  Printed work excuse as requested,   Discontinued from monitor  Allowed pt to dress self.  Pt ambulatory and stable at time of discharge.

## 2023-07-17 NOTE — ED NOTES
Changed pt's blood pressure cuff and updated VS.  IV infusing to site in left AC.   Pt tolerating well.  No s/s of distress noted. Pt visualized, respirations even and unlabored, side rails up x 1, bed locked and in low position. Call bell with in reach. Pt up to date of plan of care and all needs currently addressed and met.   Instruct to call with problems, needs, or concerns.   Will continue to monitor.

## 2023-07-17 NOTE — ED NOTES
Bedside report/ handoff with Sandee RODRIGUEZ  Pt on ER stretcher, bed low and locked. SR up x 1.  Cardiac monitor in progress, VS updated.  Pt being medicated by Sandee for pain to left jaw.   Instruct to call with problems, needs, or concerns.  Pt verbalized understanding  Will continue to monitor

## 2023-07-17 NOTE — ED PROVIDER NOTES
ED Provider Note - 7/16/2023    History     Chief Complaint   Patient presents with    Otalgia     Reports L sided ear pain that radiates into neck and jaw. No meds taken for pain PTA. Pts /146 in triage. Denies headache, states vision has been more blurry that normal over last week. Took BP medication today for first time in 2 months     Patient currently presents with concern regarding pain along the left side of her neck.  She localizes this to the area just deep to the angle of the mandible.  She notes that this started yesterday.  Patient additionally notes concern regarding elevated blood pressure but reports she has not taken her blood pressure medication in quite some time until point just prior to arrival.  She denies associated chest pain or shortness of breath nor does she report any weakness or numbness but she does endorse increased blurry vision over the past week.  She notes that she has some visual changes associated with glaucoma but feels that they may be worse over the past few days.  Patient denies associated fever or chills.  She does not report hearing loss in the ipsilateral ear nor does she report any ongoing dental issue.    Review of patient's allergies indicates:  No Known Allergies  Past Medical History:   Diagnosis Date    Arthritis     Diabetes mellitus, type 2     Hypertension      Past Surgical History:   Procedure Laterality Date    HERNIA REPAIR  2006    TONSILLECTOMY  1981    TUBAL LIGATION  2004     Family History   Problem Relation Age of Onset    Diabetes Mother     Hypertension Mother     Lupus Mother     Heart disease Mother     Hyperlipidemia Mother      Social History     Tobacco Use    Smoking status: Every Day     Packs/day: 1.00     Types: Cigarettes    Smokeless tobacco: Never   Substance Use Topics    Alcohol use: Yes     Comment: socially    Drug use: No     Review of Systems   Constitutional:  Negative for chills and fever.   HENT:  Negative for congestion and  rhinorrhea.    Respiratory:  Negative for cough and shortness of breath.    Cardiovascular:  Negative for chest pain and palpitations.   Gastrointestinal:  Negative for abdominal pain, diarrhea and vomiting.   Genitourinary:  Negative for difficulty urinating and dysuria.   Skin:  Negative for color change and rash.   Neurological:  Negative for dizziness and light-headedness.   Hematological:  Negative for adenopathy. Does not bruise/bleed easily.     Physical Exam     Initial Vitals   BP Pulse Resp Temp SpO2   07/16/23 1932 07/16/23 1930 07/16/23 1930 07/16/23 1930 07/16/23 1930   (!) 225/146 91 18 98.3 °F (36.8 °C) 98 %      MAP       --                Vitals:    07/16/23 1930 07/16/23 1932 07/16/23 2009 07/16/23 2032   BP:  (!) 225/146 (!) 199/103 (!) 207/105   Pulse: 91  84 87   Resp: 18  (!) 25    Temp: 98.3 °F (36.8 °C)      TempSrc: Oral      SpO2: 98%  97% 98%   Weight: 113.4 kg (250 lb)       07/16/23 2053   BP:    Pulse:    Resp: 20   Temp:    TempSrc:    SpO2:    Weight:      Physical Exam    Nursing note and vitals reviewed.  Constitutional: She appears well-developed and well-nourished. She is not diaphoretic. No distress.   HENT:   Head: Normocephalic and atraumatic.   Nose: Nose normal.   Mouth/Throat: Oropharynx is clear and moist.   Eyes: Conjunctivae, EOM and lids are normal. Pupils are equal, round, and reactive to light. No scleral icterus.   IOP 21 bilaterally per tonopen   Neck: Neck supple. No thyromegaly present. No tracheal deviation present. No JVD present.   Cardiovascular:  Normal rate, regular rhythm, normal heart sounds and intact distal pulses.           Pulmonary/Chest: Breath sounds normal. No stridor. No respiratory distress.   Abdominal: Abdomen is soft. There is no abdominal tenderness.   Musculoskeletal:         General: No edema. Normal range of motion.      Cervical back: Neck supple.     Lymphadenopathy:     She has cervical adenopathy.   Neurological: She is alert and  oriented to person, place, and time. She has normal strength. No cranial nerve deficit or sensory deficit. GCS score is 15. GCS eye subscore is 4. GCS verbal subscore is 5. GCS motor subscore is 6.   Skin: Skin is warm and dry.       ED Course   Procedures                   MDM  Differential Diagnoses   Based on available history, the working differential diagnoses considered during this evaluation include but are not limited to  lymphadenitis, parotitis, soft tissue infection .      LABS     Labs Reviewed   CBC W/ AUTO DIFFERENTIAL - Abnormal; Notable for the following components:       Result Value    Gran # (ANC) 8.1 (*)     All other components within normal limits   COMPREHENSIVE METABOLIC PANEL - Abnormal; Notable for the following components:    Sodium 135 (*)     Glucose 323 (*)     Calcium 8.5 (*)     All other components within normal limits   POCT GLUCOSE - Abnormal; Notable for the following components:    POCT Glucose 282 (*)     All other components within normal limits   TROPONIN I   POCT URINE PREGNANCY     All available results from the labs ordered were independently reviewed.  CBC unremarkable, CMP notable for hyperglycemia, Troponin RESULT : normal, and UPT negative    Imaging     Imaging Results              CT Soft Tissue Neck With Contrast (Final result)  Result time 07/16/23 21:41:43      Final result by Susan Louie MD (07/16/23 21:41:43)                   Impression:      No acute pathology      Electronically signed by: Susan Louie  Date:    07/16/2023  Time:    21:41               Narrative:    EXAMINATION:  CT SOFT TISSUE NECK WITH CONTRAST    CLINICAL HISTORY:  Soft tissue swelling, infection suspected, neck xray done;    COMPARISON:  None available    As per PQRS measures, all CT scans at this facility used dose modulation, iterative reconstruction, and/or weight based dose adjustment when appropriate to reduce radiation dose to as low as reasonably  achievable.    FINDINGS:  No sizable drainable fluid loculation identified.  No mucosal pathology noted.  Epiglottis unremarkable.  Shotty lymph nodes present without suppuration.  No airway compromise seen                                         EKG   EKG Readings: (Independently Interpreted)   Initial Reading: No STEMI. Rhythm: Normal Sinus Rhythm. Heart Rate: 85. Ectopy: No Ectopy. Axis: Normal. Other Findings: Prolonged QT Interval.     ED Management/Discussion     Medications   sodium chloride 0.9% bolus 1,000 mL 1,000 mL (1,000 mLs Intravenous New Bag 7/16/23 2127)   TETRAcaine HCl (PF) 0.5 % Drop 2 drop (2 drops Both Eyes Given 7/16/23 2020)   HYDROcodone-acetaminophen 5-325 mg per tablet 1 tablet (1 tablet Oral Given 7/16/23 2053)   ondansetron disintegrating tablet 4 mg (4 mg Oral Given 7/16/23 2055)                   The patient's list of active medical problems, social history, medications, and allergies as documented per RN staff has been reviewed.                 Clinical findings consistent with that of cervical lymphadenopathy though parotitis remains in the differential diagnosis.  Findings for parotitis however were not noted in the review from Radiology.  I do feel there may be some slight enlargement on that left parotid gland.  We did initiate a course of amoxicillin following an initial dose of Augmentin here in the department.  We have encouraged the patient to monitor this area closely for any changes in return with any concern.    Patient has additionally been provided with refills for her antihypertensive medications.  We have discussed the importance of this going forward as it does not appear she has been regularly taking the medication.  We have also re-initiated a course of metformin as she is not currently taking any type of hypoglycemic medications.    On final assessment, the patient appears well and comfortable for discharge.  I see no indication of an emergent process beyond that  addressed during our encounter but have duly counseled the patient/family regarding the need for prompt follow-up as well as the indications that should prompt immediate return to the emergency room should new or worrisome developments occur.  The patient/family has been provided with verbal and printed direction regarding our final diagnosis(es) as well as instructions regarding use of OTC and/or Rx medications intended to manage the patient's aforementioned conditions including:  ED Prescriptions       Medication Sig Dispense Start Date End Date Auth. Provider    amoxicillin (AMOXIL) 500 MG Tab Take 1 tablet (500 mg total) by mouth every 12 (twelve) hours. for 10 days 20 tablet 7/16/2023 7/26/2023 Mario Cabrera MD    losartan-hydrochlorothiazide 100-25 mg (HYZAAR) 100-25 mg per tablet Take 1 tablet by mouth once daily. 30 tablet 7/16/2023 -- Mario Cabrera MD    metFORMIN (GLUCOPHAGE) 500 MG tablet Take 1 tablet (500 mg total) by mouth 2 (two) times daily with meals. 60 tablet 7/16/2023 -- Mario Cabrera MD    ketorolac (TORADOL) 10 mg tablet Take 1 tablet (10 mg total) by mouth every 8 (eight) hours as needed for Pain. 15 tablet 7/16/2023 -- Mario Cabrera MD              Patient has been advised of the following recommended follow-up instructions:  Follow-up Information       Follow up With Specialties Details Why Contact Info    Sebastian Alas MD Family Medicine Schedule an appointment as soon as possible for a visit in 1 day for reassessment [address]  [facility] [address]  [facility] [address] [phone]      [facility] - Emergency Dept Emergency Medicine Go to  As needed, If symptoms worsen [address] [facility] [facility] [address] [phone]          The patient/family communicates understanding of all this information and all remaining questions and concerns were addressed at this time.      Referrals:  No orders of the defined types were placed in this  encounter.      CLINICAL IMPRESSION    ICD-10-CM ICD-9-CM   1. Cervical lymphadenopathy  R59.0 785.6   2. Hypertension  I10 401.9   3. Hyperglycemia due to diabetes mellitus  E11.65 250.02   4. Poorly-controlled hypertension  I10 401.9   5. Type 2 diabetes mellitus with hyperglycemia, without long-term current use of insulin  E11.65 250.00     790.29                Mario Cabrera MD  07/17/23 0706

## 2025-02-18 ENCOUNTER — HOSPITAL ENCOUNTER (EMERGENCY)
Facility: HOSPITAL | Age: 45
Discharge: ANOTHER HOSPITAL | End: 2025-02-18
Attending: EMERGENCY MEDICINE
Payer: MEDICAID

## 2025-02-18 VITALS
SYSTOLIC BLOOD PRESSURE: 152 MMHG | BODY MASS INDEX: 37.49 KG/M2 | DIASTOLIC BLOOD PRESSURE: 70 MMHG | WEIGHT: 225 LBS | RESPIRATION RATE: 22 BRPM | HEART RATE: 89 BPM | HEIGHT: 65 IN | TEMPERATURE: 99 F | OXYGEN SATURATION: 89 %

## 2025-02-18 DIAGNOSIS — E87.6 HYPOKALEMIA: ICD-10-CM

## 2025-02-18 DIAGNOSIS — A41.9 SEPSIS, DUE TO UNSPECIFIED ORGANISM, UNSPECIFIED WHETHER ACUTE ORGAN DYSFUNCTION PRESENT: Primary | ICD-10-CM

## 2025-02-18 DIAGNOSIS — N12 PYELONEPHRITIS OF RIGHT KIDNEY: ICD-10-CM

## 2025-02-18 DIAGNOSIS — N17.9 AKI (ACUTE KIDNEY INJURY): ICD-10-CM

## 2025-02-18 DIAGNOSIS — E11.65 TYPE 2 DIABETES MELLITUS WITH HYPERGLYCEMIA, WITHOUT LONG-TERM CURRENT USE OF INSULIN: ICD-10-CM

## 2025-02-18 LAB
ALBUMIN SERPL BCP-MCNC: 3.7 G/DL (ref 3.5–5.2)
ALLENS TEST: ABNORMAL
ALP SERPL-CCNC: 151 U/L (ref 38–126)
ALT SERPL W/O P-5'-P-CCNC: 36 U/L (ref 10–44)
ANION GAP SERPL CALC-SCNC: 15 MMOL/L (ref 8–16)
ANION GAP SERPL CALC-SCNC: 7 MMOL/L (ref 8–16)
AST SERPL-CCNC: 43 U/L (ref 15–46)
B-HCG UR QL: NEGATIVE
B-OH-BUTYR BLD STRIP-SCNC: 0.3 MMOL/L (ref 0–0.5)
BACTERIA #/AREA URNS AUTO: ABNORMAL /HPF
BASOPHILS # BLD AUTO: 0.07 K/UL (ref 0–0.2)
BASOPHILS NFR BLD: 0.3 % (ref 0–1.9)
BILIRUB SERPL-MCNC: 0.7 MG/DL (ref 0.1–1)
BILIRUB UR QL STRIP: NEGATIVE
CALCIUM SERPL-MCNC: 8.2 MG/DL (ref 8.7–10.5)
CALCIUM SERPL-MCNC: 8.8 MG/DL (ref 8.7–10.5)
CHLORIDE SERPL-SCNC: 90 MMOL/L (ref 95–110)
CHLORIDE SERPL-SCNC: 92 MMOL/L (ref 95–110)
CLARITY UR REFRACT.AUTO: ABNORMAL
CO2 SERPL-SCNC: 23 MMOL/L (ref 23–29)
CO2 SERPL-SCNC: 28 MMOL/L (ref 23–29)
COLOR UR AUTO: YELLOW
CREAT SERPL-MCNC: 1.07 MG/DL (ref 0.5–1.4)
CREAT SERPL-MCNC: 1.15 MG/DL (ref 0.5–1.4)
CTP QC/QA: YES
DELSYS: ABNORMAL
DIFFERENTIAL METHOD BLD: ABNORMAL
EOSINOPHIL # BLD AUTO: 0 K/UL (ref 0–0.5)
EOSINOPHIL NFR BLD: 0 % (ref 0–8)
ERYTHROCYTE [DISTWIDTH] IN BLOOD BY AUTOMATED COUNT: 12.4 % (ref 11.5–14.5)
EST. GFR  (NO RACE VARIABLE): 59.5 ML/MIN/1.73 M^2
EST. GFR  (NO RACE VARIABLE): >60 ML/MIN/1.73 M^2
GLUCOSE SERPL-MCNC: 443 MG/DL (ref 70–110)
GLUCOSE SERPL-MCNC: 561 MG/DL (ref 70–110)
GLUCOSE UR QL STRIP: ABNORMAL
HCO3 UR-SCNC: 25 MMOL/L (ref 24–28)
HCT VFR BLD AUTO: 47.5 % (ref 37–48.5)
HGB BLD-MCNC: 16.4 G/DL (ref 12–16)
HGB UR QL STRIP: ABNORMAL
HYALINE CASTS UR QL AUTO: 0 /LPF
IMM GRANULOCYTES # BLD AUTO: 0.2 K/UL (ref 0–0.04)
IMM GRANULOCYTES NFR BLD AUTO: 0.8 % (ref 0–0.5)
INFLUENZA A, MOLECULAR: NEGATIVE
INFLUENZA B, MOLECULAR: NEGATIVE
KETONES UR QL STRIP: ABNORMAL
LACTATE SERPL-SCNC: 1.7 MMOL/L (ref 0.5–2.2)
LACTATE SERPL-SCNC: 2.5 MMOL/L (ref 0.5–2.2)
LEUKOCYTE ESTERASE UR QL STRIP: NEGATIVE
LIPASE SERPL-CCNC: 35 U/L (ref 23–300)
LYMPHOCYTES # BLD AUTO: 0.7 K/UL (ref 1–4.8)
LYMPHOCYTES NFR BLD: 2.6 % (ref 18–48)
MCH RBC QN AUTO: 29.2 PG (ref 27–31)
MCHC RBC AUTO-ENTMCNC: 34.5 G/DL (ref 32–36)
MCV RBC AUTO: 85 FL (ref 82–98)
MICROSCOPIC COMMENT: ABNORMAL
MONOCYTES # BLD AUTO: 1.5 K/UL (ref 0.3–1)
MONOCYTES NFR BLD: 5.8 % (ref 4–15)
NEUTROPHILS # BLD AUTO: 23.1 K/UL (ref 1.8–7.7)
NEUTROPHILS NFR BLD: 90.5 % (ref 38–73)
NITRITE UR QL STRIP: POSITIVE
NRBC BLD-RTO: 0 /100 WBC
PCO2 BLDA: 39.5 MMHG (ref 35–45)
PH SMN: 7.41 [PH] (ref 7.35–7.45)
PH UR STRIP: 6 [PH] (ref 5–8)
PLATELET # BLD AUTO: 282 K/UL (ref 150–450)
PLATELET BLD QL SMEAR: ABNORMAL
PMV BLD AUTO: 11.5 FL (ref 9.2–12.9)
PO2 BLDA: 39 MMHG (ref 40–60)
POC BE: 0 MMOL/L
POC SATURATED O2: 74 % (ref 95–100)
POC TCO2: 26 MMOL/L (ref 24–29)
POCT GLUCOSE: 409 MG/DL (ref 70–110)
POCT GLUCOSE: 477 MG/DL (ref 70–110)
POCT GLUCOSE: >500 MG/DL (ref 70–110)
POTASSIUM SERPL-SCNC: 3.2 MMOL/L (ref 3.5–5.1)
POTASSIUM SERPL-SCNC: 3.5 MMOL/L (ref 3.5–5.1)
PROT SERPL-MCNC: 7.6 G/DL (ref 6–8.4)
PROT UR QL STRIP: ABNORMAL
RBC # BLD AUTO: 5.62 M/UL (ref 4–5.4)
RBC #/AREA URNS AUTO: 2 /HPF (ref 0–4)
SAMPLE: ABNORMAL
SITE: ABNORMAL
SODIUM SERPL-SCNC: 127 MMOL/L (ref 136–145)
SODIUM SERPL-SCNC: 128 MMOL/L (ref 136–145)
SP GR UR STRIP: 1.01 (ref 1–1.03)
SPECIMEN SOURCE: NORMAL
URN SPEC COLLECT METH UR: ABNORMAL
UROBILINOGEN UR STRIP-ACNC: NEGATIVE EU/DL
UUN UR-MCNC: 25 MG/DL (ref 7–17)
UUN UR-MCNC: 26 MG/DL (ref 7–17)
WBC # BLD AUTO: 25.46 K/UL (ref 3.9–12.7)
WBC #/AREA URNS AUTO: 15 /HPF (ref 0–5)
YEAST UR QL AUTO: ABNORMAL

## 2025-02-18 PROCEDURE — 96376 TX/PRO/DX INJ SAME DRUG ADON: CPT | Mod: ER

## 2025-02-18 PROCEDURE — 87154 CUL TYP ID BLD PTHGN 6+ TRGT: CPT | Mod: ER | Performed by: EMERGENCY MEDICINE

## 2025-02-18 PROCEDURE — 96361 HYDRATE IV INFUSION ADD-ON: CPT | Mod: ER

## 2025-02-18 PROCEDURE — 82962 GLUCOSE BLOOD TEST: CPT | Mod: ER

## 2025-02-18 PROCEDURE — 83930 ASSAY OF BLOOD OSMOLALITY: CPT | Mod: ER | Performed by: EMERGENCY MEDICINE

## 2025-02-18 PROCEDURE — 96365 THER/PROPH/DIAG IV INF INIT: CPT | Mod: 59,ER

## 2025-02-18 PROCEDURE — 82803 BLOOD GASES ANY COMBINATION: CPT | Mod: ER

## 2025-02-18 PROCEDURE — 25500020 PHARM REV CODE 255: Mod: ER | Performed by: EMERGENCY MEDICINE

## 2025-02-18 PROCEDURE — 25000003 PHARM REV CODE 250: Mod: ER | Performed by: EMERGENCY MEDICINE

## 2025-02-18 PROCEDURE — 81025 URINE PREGNANCY TEST: CPT | Mod: ER | Performed by: EMERGENCY MEDICINE

## 2025-02-18 PROCEDURE — 87186 SC STD MICRODIL/AGAR DIL: CPT | Mod: ER | Performed by: EMERGENCY MEDICINE

## 2025-02-18 PROCEDURE — 87040 BLOOD CULTURE FOR BACTERIA: CPT | Mod: 59,ER | Performed by: EMERGENCY MEDICINE

## 2025-02-18 PROCEDURE — 83690 ASSAY OF LIPASE: CPT | Mod: ER | Performed by: EMERGENCY MEDICINE

## 2025-02-18 PROCEDURE — 87502 INFLUENZA DNA AMP PROBE: CPT | Mod: ER | Performed by: EMERGENCY MEDICINE

## 2025-02-18 PROCEDURE — 80048 BASIC METABOLIC PNL TOTAL CA: CPT | Mod: ER,XB | Performed by: EMERGENCY MEDICINE

## 2025-02-18 PROCEDURE — 63600175 PHARM REV CODE 636 W HCPCS: Mod: JZ,TB,ER | Performed by: EMERGENCY MEDICINE

## 2025-02-18 PROCEDURE — 85025 COMPLETE CBC W/AUTO DIFF WBC: CPT | Mod: ER | Performed by: EMERGENCY MEDICINE

## 2025-02-18 PROCEDURE — S4991 NICOTINE PATCH NONLEGEND: HCPCS | Mod: ER | Performed by: EMERGENCY MEDICINE

## 2025-02-18 PROCEDURE — 81000 URINALYSIS NONAUTO W/SCOPE: CPT | Mod: ER | Performed by: EMERGENCY MEDICINE

## 2025-02-18 PROCEDURE — 96367 TX/PROPH/DG ADDL SEQ IV INF: CPT | Mod: ER

## 2025-02-18 PROCEDURE — 99291 CRITICAL CARE FIRST HOUR: CPT | Mod: ER

## 2025-02-18 PROCEDURE — 80053 COMPREHEN METABOLIC PANEL: CPT | Mod: ER | Performed by: EMERGENCY MEDICINE

## 2025-02-18 PROCEDURE — 83605 ASSAY OF LACTIC ACID: CPT | Mod: 91,ER | Performed by: EMERGENCY MEDICINE

## 2025-02-18 PROCEDURE — 99900035 HC TECH TIME PER 15 MIN (STAT): Mod: ER

## 2025-02-18 PROCEDURE — 96375 TX/PRO/DX INJ NEW DRUG ADDON: CPT | Mod: 59,ER

## 2025-02-18 PROCEDURE — 82010 KETONE BODYS QUAN: CPT | Mod: ER | Performed by: EMERGENCY MEDICINE

## 2025-02-18 PROCEDURE — 87086 URINE CULTURE/COLONY COUNT: CPT | Mod: ER | Performed by: EMERGENCY MEDICINE

## 2025-02-18 PROCEDURE — 87077 CULTURE AEROBIC IDENTIFY: CPT | Mod: 59,ER | Performed by: EMERGENCY MEDICINE

## 2025-02-18 RX ORDER — KETOROLAC TROMETHAMINE 30 MG/ML
10 INJECTION, SOLUTION INTRAMUSCULAR; INTRAVENOUS
Status: COMPLETED | OUTPATIENT
Start: 2025-02-18 | End: 2025-02-18

## 2025-02-18 RX ORDER — ONDANSETRON HYDROCHLORIDE 2 MG/ML
4 INJECTION, SOLUTION INTRAVENOUS
Status: COMPLETED | OUTPATIENT
Start: 2025-02-18 | End: 2025-02-18

## 2025-02-18 RX ORDER — POTASSIUM CHLORIDE 7.45 MG/ML
10 INJECTION INTRAVENOUS
Status: COMPLETED | OUTPATIENT
Start: 2025-02-18 | End: 2025-02-18

## 2025-02-18 RX ORDER — POTASSIUM CHLORIDE 20 MEQ/1
40 TABLET, EXTENDED RELEASE ORAL
Status: COMPLETED | OUTPATIENT
Start: 2025-02-18 | End: 2025-02-18

## 2025-02-18 RX ORDER — CEFTRIAXONE 500 MG/1
500 INJECTION, POWDER, FOR SOLUTION INTRAMUSCULAR; INTRAVENOUS
Status: DISCONTINUED | OUTPATIENT
Start: 2025-02-18 | End: 2025-02-18

## 2025-02-18 RX ORDER — CEFTRIAXONE 1 G/1
1 INJECTION, POWDER, FOR SOLUTION INTRAMUSCULAR; INTRAVENOUS
Status: COMPLETED | OUTPATIENT
Start: 2025-02-18 | End: 2025-02-18

## 2025-02-18 RX ORDER — MORPHINE SULFATE 4 MG/ML
4 INJECTION, SOLUTION INTRAMUSCULAR; INTRAVENOUS
Refills: 0 | Status: COMPLETED | OUTPATIENT
Start: 2025-02-18 | End: 2025-02-18

## 2025-02-18 RX ORDER — HYDROMORPHONE HYDROCHLORIDE 1 MG/ML
1 INJECTION, SOLUTION INTRAMUSCULAR; INTRAVENOUS; SUBCUTANEOUS
Refills: 0 | Status: COMPLETED | OUTPATIENT
Start: 2025-02-18 | End: 2025-02-18

## 2025-02-18 RX ORDER — IBUPROFEN 200 MG
200-800 TABLET ORAL EVERY 6 HOURS PRN
Status: ON HOLD | COMMUNITY

## 2025-02-18 RX ORDER — IBUPROFEN 200 MG
1 TABLET ORAL
Status: DISCONTINUED | OUTPATIENT
Start: 2025-02-18 | End: 2025-02-18 | Stop reason: HOSPADM

## 2025-02-18 RX ADMIN — KETOROLAC TROMETHAMINE 10 MG: 30 INJECTION, SOLUTION INTRAMUSCULAR at 03:02

## 2025-02-18 RX ADMIN — MORPHINE SULFATE 4 MG: 4 INJECTION INTRAVENOUS at 12:02

## 2025-02-18 RX ADMIN — SODIUM CHLORIDE 1000 ML: 9 INJECTION, SOLUTION INTRAVENOUS at 12:02

## 2025-02-18 RX ADMIN — HYDROMORPHONE HYDROCHLORIDE 1 MG: 1 INJECTION, SOLUTION INTRAMUSCULAR; INTRAVENOUS; SUBCUTANEOUS at 03:02

## 2025-02-18 RX ADMIN — IOHEXOL 100 ML: 350 INJECTION, SOLUTION INTRAVENOUS at 12:02

## 2025-02-18 RX ADMIN — NICOTINE 1 PATCH: 21 PATCH, EXTENDED RELEASE TRANSDERMAL at 05:02

## 2025-02-18 RX ADMIN — POTASSIUM CHLORIDE 40 MEQ: 1500 TABLET, EXTENDED RELEASE ORAL at 05:02

## 2025-02-18 RX ADMIN — HUMAN INSULIN 5 UNITS: 100 INJECTION, SOLUTION SUBCUTANEOUS at 05:02

## 2025-02-18 RX ADMIN — POTASSIUM CHLORIDE 10 MEQ: 7.46 INJECTION, SOLUTION INTRAVENOUS at 01:02

## 2025-02-18 RX ADMIN — CEFTRIAXONE SODIUM 1 G: 1 INJECTION, POWDER, FOR SOLUTION INTRAMUSCULAR; INTRAVENOUS at 01:02

## 2025-02-18 RX ADMIN — SODIUM CHLORIDE 1000 ML: 9 INJECTION, SOLUTION INTRAVENOUS at 01:02

## 2025-02-18 RX ADMIN — ONDANSETRON 4 MG: 2 INJECTION INTRAMUSCULAR; INTRAVENOUS at 12:02

## 2025-02-18 RX ADMIN — POTASSIUM CHLORIDE 10 MEQ: 7.46 INJECTION, SOLUTION INTRAVENOUS at 02:02

## 2025-02-18 RX ADMIN — HUMAN INSULIN 6 UNITS: 100 INJECTION, SOLUTION SUBCUTANEOUS at 04:02

## 2025-02-18 RX ADMIN — PROMETHAZINE HYDROCHLORIDE 12.5 MG: 25 INJECTION INTRAMUSCULAR; INTRAVENOUS at 03:02

## 2025-02-18 RX ADMIN — HYDROMORPHONE HYDROCHLORIDE 1 MG: 1 INJECTION, SOLUTION INTRAMUSCULAR; INTRAVENOUS; SUBCUTANEOUS at 12:02

## 2025-02-18 NOTE — PROVIDER TRANSFER
(Physician in Lead of Transfers)  Outside Transfer Acceptance Note / Regional Referral Center      Upon patient arrival, please contact Hospital Medicine on call.    Referring facility: [facility]   Referring provider: DAVID GASTON  Accepting facility: [facility]  Accepting provider: ZULEIKA MARIE  Admitting provider: AKHIL CAMACHO  Reason for transfer:  treatment of pyelonephritis  Transfer diagnosis: pyelonephritis  Transfer specialty requested: Hospital Medicine  Transfer specialty notified: Yes  Transfer level: NUMBER 1-5: 2  Bed type requested: med-tele  Isolation status: No active isolations   Admission class or status: OP- Observation      Narrative     44-year-old female with a history of arthritis, hypertension, diabetes, nephrolithiasis, and pyeolnephritis presented to the [facility] emergency department on February 18 with lower back and pelvic pain for about 6 days.  Symptoms have worsened.  She took an antibiotic she had at home starting the day prior to presentation, but symptoms did not improve. She reported nausea with vomiting and diarrhea associated.  Labs were concerning for hyperglycemia without evidence of DKA, lactic acidosis, creatinine elevated from baseline, and leukocytosis.  CT abdomen and pelvis had findings concerning for right-sided pyelonephritis.  Blood and urine cultures were collected.  She received pain (Dilaudid, Toradol morphine) and nausea (Zofran and promethazine) medication along with IV fluid, potassium replacement, and ceftriaxone.  ED is requesting transfer to Hospital Medicine at [facility] for continued treatment of pyelonephritis.  They are rechecking glucose after the IV fluid, will treat glucose as needed    Influenza negative, lactic acid 2.5, beta hydroxybutyrate 0.3, sodium 128, potassium 3.2, chloride 90, CO2 23, BUN 25, creatinine 1.15, glucose 561, albumin 3.7, AST 43, ALT 36, white  blood cells 25.46, hemoglobin 16.4, hematocrit 47 point, platelets 282, lipase 35, urine pregnancy test negative  -urinalysis had 1+ protein, 3+ glucose, trace ketone, 1+ blood, positive nitrite, 2 RBC, 15 WBC, rare bacteria  -venous pH 7.41, pCO2 39.5  -blood and urine cultures collected    CT abdomen and pelvis with IV contrast showed right-sided pyelonephritis.  No hydronephrosis.    VS:  Temperature 98.7°, pulse 96, respirations 15, blood pressure 180/79, O2 sats 93%    Objective     Vitals: Temp: 98.7 °F (37.1 °C) (02/18/25 1340)  Pulse: 96 (02/18/25 1416)  Resp: 20 (02/18/25 1515)  BP: (!) 180/79 (02/18/25 1400)  SpO2: (!) 93 % (02/18/25 1400)  Recent Labs: CBC:   Recent Labs   Lab 02/18/25  1205   WBC 25.46*   HGB 16.4*   HCT 47.5        CMP:   Recent Labs   Lab 02/18/25  1205   *   K 3.2*   CL 90*   CO2 23   *   BUN 25*   CREATININE 1.15   CALCIUM 8.8   PROT 7.6   ALBUMIN 3.7   BILITOT 0.7   ALKPHOS 151*   AST 43   ALT 36   ANIONGAP 15         Instructions    Admit to Hospital Medicine      LUIZ Shi MD  Hospital Medicine Staff  Cell: [phone]

## 2025-02-18 NOTE — PHARMACY MED REC
"  [facility]           Pharmacy  Admission Medication History     The home medication history was taken by Norah Velazquez.      Medication history obtained from Medications listed below were obtained from: Patient/family.    Based on information gathered for medication list, you may go to "Admission" then "Reconcile Home Medications" tabs to review and/or act upon those items.     The home medication list has been updated by the Pharmacy department.   Please read ALL comments highlighted in yellow.   Please address this information as you see fit.    Feel free to contact us if you have any questions or require assistance.    The medications listed below were removed from the home medication list.  Please reorder if appropriate:    Patient reports NOT TAKING the following medication(s):  Aspirin 81 mg tab  Latanoprost opht drops  Metformin 500 mg tab  Ketorolac 10 mg tab    No current facility-administered medications on file prior to encounter.     Current Outpatient Medications on File Prior to Encounter   Medication Sig Dispense Refill    acetaminophen/pamabrom (MIDOL ORAL) Take 1 tablet by mouth daily as needed.      amlodipine bes/olmesartan med (BIANKA ORAL) Take 1 tablet by mouth daily as needed.      ibuprofen (ADVIL,MOTRIN) 200 MG tablet Take 200-800 mg by mouth every 6 (six) hours as needed for Pain.      losartan-hydrochlorothiazide 100-25 mg (HYZAAR) 100-25 mg per tablet Take 1 tablet by mouth once daily. 30 tablet 0       Please address this information as you see fit.  Feel free to contact us if you have any questions or require assistance.    Norah Velazquez  [phone]                .          "

## 2025-02-18 NOTE — ED PROVIDER NOTES
Encounter Date: 2/18/2025       History     Chief Complaint   Patient presents with    Back Pain     Lower back pain and lower abd pain since Wednesday. Pt also reports urinary discomfort and emesis     Patient is a 44-year-old female with past medical history of DM2, hypertension, kidney stones, pyelonephritis who presents with severe lower back pain and pelvic pain that started about 6 days ago and has worsened.  Yesterday she started taking an antibiotic that her  had at home which he believes his penicillin but her condition did not improve.  She has associated nausea and vomiting and diarrhea.      Review of patient's allergies indicates:  No Known Allergies  Past Medical History:   Diagnosis Date    Arthritis     Diabetes mellitus, type 2     Hypertension      Past Surgical History:   Procedure Laterality Date    HERNIA REPAIR  2006    TONSILLECTOMY  1981    TUBAL LIGATION  2004     Family History   Problem Relation Name Age of Onset    Diabetes Mother      Hypertension Mother      Lupus Mother      Heart disease Mother      Hyperlipidemia Mother       Social History[1]      Physical Exam     Initial Vitals [02/18/25 1139]   BP Pulse Resp Temp SpO2   (!) 145/96 96 19 98.5 °F (36.9 °C) 98 %      MAP       --         Physical Exam    Nursing note and vitals reviewed.  Constitutional: She appears well-developed and well-nourished. She is not diaphoretic. No distress.   HENT:   Head: Normocephalic and atraumatic.   Eyes: Conjunctivae and EOM are normal.   Neck: Neck supple.   Normal range of motion.  Cardiovascular:  Regular rhythm.           tachycardia   Pulmonary/Chest: No respiratory distress.   Abdominal: She exhibits no distension. There is abdominal tenderness (R sided and suprapubic tenderness). There is rebound (voluntary).   Musculoskeletal:         General: Normal range of motion.      Cervical back: Normal range of motion and neck supple.      Comments: R CVA tenderness     Neurological: She is  alert and oriented to person, place, and time. GCS score is 15. GCS eye subscore is 4. GCS verbal subscore is 5. GCS motor subscore is 6.   Skin: Skin is warm and dry.   Psychiatric: She has a normal mood and affect. Her behavior is normal. Judgment and thought content normal.         ED Course   Procedures  Labs Reviewed   CULTURE, BLOOD - Abnormal       Result Value    Blood Culture, Routine Gram stain yessenia bottle: Gram negative rods      Blood Culture, Routine        Value: Results called to and read back by: Ishaan Schulz MD    Blood Culture, Routine 02/19/2025  11:29      Blood Culture, Routine Gram stain aer bottle: Gram negative rods      Blood Culture, Routine   (*)     Value: GRAM NEGATIVE LESLY  Identification and susceptibility pending      Narrative:     Aerobic and anaerobic   CULTURE, BLOOD - Abnormal    Blood Culture, Routine Gram stain yessenia bottle: Gram negative rods      Blood Culture, Routine        Value: Results called to and read back by: Ishaan Schulz MD    Blood Culture, Routine 02/19/2025  11:29      Blood Culture, Routine Gram stain aer bottle: Gram negative rods      Blood Culture, Routine   (*)     Value: GRAM NEGATIVE LESLY  Identification pending  For susceptibility see order #[id number]      Narrative:     Aerobic and anaerobic   RAPID ORGANISM ID BY PCR (FROM BLOOD CULTURE) - Abnormal    Enterococcus faecalis Not Detected      Enterococcus faecium Not Detected      Listeria monocytogenes Not Detected      Staphylococcus spp. Not Detected      Staphylococcus aureus Not Detected      Staphylococcus epidermidis Not Detected      Staphylococcus lugdunensis Not Detected      Streptococcus species Not Detected      Streptococcus agalactiae Not Detected      Streptococcus pneumoniae Not Detected      Streptococcus pyogenes Not Detected      Acinetobacter calcoaceticus/baumannii complex Not Detected      Bacteroides fragilis Not Detected      Enterobacterales See species for ID (*)      Enterobacter cloacae complex Not Detected      Escherichia coli Detected (*)     Klebsiella aerogenes Not Detected      Klebsiella oxytoca Not Detected      Klebsiella pneumoniae group Not Detected      Proteus Not Detected      Salmonella sp Not Detected      Serratia marcescens Not Detected      Haemophilus influenzae Not Detected      Neisseria meningtidis Not Detected      Pseudomonas aeruginosa Not Detected      Stenotrophomonas maltophilia Not Detected      Candida albicans Not Detected      Candida auris Not Detected      Candida glabrata Not Detected      Candida krusei Not Detected      Candida parapsilosis Not Detected      Candida tropicalis Not Detected      Cryptococcus neoformans/gattii Not Detected      CTX-M (ESBL ) Not Detected      IMP (Carbapenem resistant) Not Detected      KPC resistance gene (Carbapenem resistant) Not Detected      mcr-1  Not Detected      mec A/C  Test Not Applicable      mec A/C and MREJ (MRSA) gene Test Not Applicable      NDM (Carbapenem resistant) Not Detected      OXA-48-like (Carbapenem resistant) Not Detected      van A/B (VRE gene) Test Not Applicable      VIM (Carbapenem resistant) Not Detected      Narrative:     Aerobic and anaerobic   COMPREHENSIVE METABOLIC PANEL - Abnormal    Sodium 128 (*)     Potassium 3.2 (*)     Chloride 90 (*)     CO2 23      Glucose 561 (*)     BUN 25 (*)     Creatinine 1.15      Calcium 8.8      Total Protein 7.6      Albumin 3.7      Total Bilirubin 0.7      Alkaline Phosphatase 151 (*)     AST 43      ALT 36      Anion Gap 15      eGFR 59.5 (*)    CBC W/ AUTO DIFFERENTIAL - Abnormal    WBC 25.46 (*)     RBC 5.62 (*)     Hemoglobin 16.4 (*)     Hematocrit 47.5      MCV 85      MCH 29.2      MCHC 34.5      RDW 12.4      Platelets 282      MPV 11.5      Immature Granulocytes 0.8 (*)     Gran # (ANC) 23.1 (*)     Immature Grans (Abs) 0.20 (*)     Lymph # 0.7 (*)     Mono # 1.5 (*)     Eos # 0.0      Baso # 0.07      nRBC 0      Gran  % 90.5 (*)     Lymph % 2.6 (*)     Mono % 5.8      Eosinophil % 0.0      Basophil % 0.3      Platelet Estimate Appears normal      Differential Method Automated     URINALYSIS, REFLEX TO URINE CULTURE - Abnormal    Specimen UA Urine, Clean Catch      Color, UA Yellow      Appearance, UA Hazy (*)     pH, UA 6.0      Specific Gravity, UA 1.015      Protein, UA 1+ (*)     Glucose, UA 3+ (*)     Ketones, UA Trace (*)     Bilirubin (UA) Negative      Occult Blood UA 1+ (*)     Nitrite, UA Positive (*)     Urobilinogen, UA Negative      Leukocytes, UA Negative      Narrative:     Preferred Collection Type->Urine, Clean Catch  Specimen Source->Urine   URINALYSIS MICROSCOPIC - Abnormal    RBC, UA 2      WBC, UA 15 (*)     Bacteria Rare      Yeast, UA None      Hyaline Casts, UA 0      Microscopic Comment SEE COMMENT      Narrative:     Preferred Collection Type->Urine, Clean Catch  Specimen Source->Urine   LACTIC ACID, PLASMA - Abnormal    Lactate (Lactic Acid) 2.5 (*)    OSMOLALITY, SERUM - Abnormal    Osmolality 305 (*)    BASIC METABOLIC PANEL - Abnormal    Sodium 127 (*)     Potassium 3.5      Chloride 92 (*)     CO2 28      Glucose 443 (*)     BUN 26 (*)     Creatinine 1.07      Calcium 8.2 (*)     Anion Gap 7 (*)     eGFR >60.0     POCT URINE PREGNANCY - Abnormal    POC Preg Test, Ur Negative (*)      Acceptable Yes     ISTAT PROCEDURE - Abnormal    POC PH 7.410      POC PCO2 39.5      POC PO2 39 (*)     POC HCO3 25.0      POC BE 0      POC SATURATED O2 74      POC TCO2 26      Sample VENOUS      Site Other      Allens Test N/A      Dels Room Air     POCT GLUCOSE - Abnormal    POCT Glucose >500 (*)    POCT GLUCOSE - Abnormal    POCT Glucose 477 (*)    POCT GLUCOSE - Abnormal    POCT Glucose 409 (*)    CULTURE, URINE    Urine Culture, Routine No growth      Narrative:     Preferred Collection Type->Urine, Clean Catch  Specimen Source->Urine   INFLUENZA A & B BY MOLECULAR    Influenza A, Molecular  Negative      Influenza B, Molecular Negative      Flu A & B Source Nasal swab     LIPASE    Lipase Result 35     BETA - HYDROXYBUTYRATE, SERUM   OSMOLALITY, SERUM   BETA - HYDROXYBUTYRATE, SERUM    Beta-Hydroxybutyrate 0.3     LACTIC ACID, PLASMA    Lactate (Lactic Acid) 1.7            Imaging Results               CT Abdomen Pelvis With IV Contrast NO Oral Contrast (Final result)  Result time 02/18/25 14:33:11      Final result by Denton Sow MD (02/18/25 14:33:11)                   Impression:      Right pyelonephritis.    Complete findings as above.    This report was flagged in Epic as abnormal.    All CT scans at this facility are performed  using dose modulation techniques as appropriate to performed exam including the following:  automated exposure control; adjustment of mA and/or kV according to the patients size (this includes techniques or standardized protocols for targeted exams where dose is matched to indication/reason for exam: i.e. extremities or head);  iterative reconstruction technique.      Electronically signed by: Denton Sow  Date:    02/18/2025  Time:    14:33               Narrative:    EXAMINATION:  CT ABDOMEN PELVIS WITH IV CONTRAST    CLINICAL HISTORY:  Nausea/vomiting;UTI, recurrent/complicated (Female);Abdominal pain, acute, nonlocalized;    TECHNIQUE:  Low dose axial images, sagittal and coronal reformations were obtained from the lung bases to the pubic symphysis.  Contrast was administered.  Images acquired after the administration 100 mL Omnipaque 350 IV contrast.    COMPARISON:  None    FINDINGS:  Heart: Normal in size. No pericardial effusion.    Lung Bases: Well aerated, without consolidation or pleural fluid.    Liver: Liver is enlarged.  No focal lesions.    Gallbladder: No calcified gallstones.    Bile Ducts: No evidence of dilated ducts.    Pancreas: No mass or peripancreatic fat stranding.    Spleen: Borderline enlarged..    Adrenals: Unremarkable.    Kidneys/  Ureters: Right pyelonephritis.  Left kidney better preserved.  No hydronephrosis.    Bladder: No evidence of wall thickening.    Reproductive organs: Unremarkable.    GI Tract/Mesentery: No evidence of bowel obstruction or inflammation.  No evidence of appendicitis.    Peritoneal Space: No ascites. No free air.    Retroperitoneum: No significant adenopathy.    Abdominal wall: Prior mesh hernia repair.    Vasculature: No significant atherosclerosis or aneurysm.    Bones: No acute fracture.                                       Medications   morphine injection 4 mg (4 mg Intravenous Given 2/18/25 1212)   ondansetron injection 4 mg (4 mg Intravenous Given 2/18/25 1214)   iohexoL (OMNIPAQUE 350) injection 100 mL (100 mLs Intravenous Given 2/18/25 1241)   sodium chloride 0.9% bolus 1,000 mL 1,000 mL (0 mLs Intravenous Stopped 2/18/25 1545)   HYDROmorphone injection 1 mg (1 mg Intravenous Given 2/18/25 1254)   potassium chloride 10 mEq in 100 mL IVPB (0 mEq Intravenous Stopped 2/18/25 1440)   potassium chloride 10 mEq in 100 mL IVPB (0 mEq Intravenous Stopped 2/18/25 1547)   cefTRIAXone injection 1 g (1 g Intravenous Given 2/18/25 1345)   sodium chloride 0.9% bolus 1,000 mL 1,000 mL (0 mLs Intravenous Stopped 2/18/25 1547)   promethazine (PHENERGAN) 12.5 mg in 0.9% NaCl 50 mL IVPB (0 mg Intravenous Stopped 2/18/25 1545)   HYDROmorphone injection 1 mg (1 mg Intravenous Given 2/18/25 1515)   ketorolac injection 9.999 mg (9.999 mg Intravenous Given 2/18/25 1516)   insulin regular injection 6 Units 0.06 mL (6 Units Intravenous Given 2/18/25 1636)   potassium chloride SA CR tablet 40 mEq (40 mEq Oral Given 2/18/25 1729)   insulin regular injection 5 Units 0.05 mL (5 Units Intravenous Given 2/18/25 1731)     Medical Decision Making  DDX: pyelonephritis, DKA, HHS, HHNK, sepsis, kidney stone, renal infarct, colitis, appendicitis, cholelithiasis, cholecystitis    Patient with R sided pyelonephritis on CT  Treated with iv fluids,  IV Rocephin, IV insulin for hyperglycemia  Patient with low potassium which was replaced iv since she is continuing to need additional insulin    Patient admitted hospital medicine at [place]        Amount and/or Complexity of Data Reviewed  Labs: ordered.  Radiology: ordered.    Risk  Prescription drug management.              Attending Attestation:         Attending Critical Care:   Critical Care Times:   ==============================================================  Total Critical Care Time - exclusive of procedural time: 40 minutes.  ==============================================================  Critical care was necessary to treat or prevent imminent or life-threatening deterioration of the following conditions: sepsis.   Critical care was time spent personally by me on the following activities: obtaining history from patient or relative, examination of patient, review of old charts, ordering lab, x-rays, and/or EKG, development of treatment plan with patient or relative, ordering and performing treatments and interventions, evaluation of patient's response to treatment, discussion with consultants and re-evaluation of patient's conition.   Critical Care Condition: potentially life-threatening           ED Course as of 02/20/25 0950   Tue Feb 18, 2025   1243 Added on labs to evaluate for sepsis, DKA, [facility]/NK [GK]   1307 Ph of 7.41   [GK]      ED Course User Index  [GK] Malena Barron MD                           Clinical Impression:  Final diagnoses:  [A41.9] Sepsis, due to unspecified organism, unspecified whether acute organ dysfunction present (Primary)  [E11.65] Type 2 diabetes mellitus with hyperglycemia, without long-term current use of insulin  [N12] Pyelonephritis of right kidney  [E87.6] Hypokalemia  [N17.9] ALISA (acute kidney injury)          ED Disposition Condition    Transfer to Another Facility Stable                    [1]   Social History  Tobacco Use    Smoking status: Every Day      Current packs/day: 1.00     Types: Cigarettes    Smokeless tobacco: Never   Substance Use Topics    Alcohol use: Yes     Comment: socially    Drug use: No

## 2025-02-19 ENCOUNTER — TELEPHONE (OUTPATIENT)
Dept: EMERGENCY MEDICINE | Facility: HOSPITAL | Age: 45
End: 2025-02-19
Payer: MEDICAID

## 2025-02-19 ENCOUNTER — RESULTS FOLLOW-UP (OUTPATIENT)
Dept: EMERGENCY MEDICINE | Facility: HOSPITAL | Age: 45
End: 2025-02-19

## 2025-02-19 PROBLEM — N12 PYELONEPHRITIS: Status: ACTIVE | Noted: 2025-02-19

## 2025-02-19 LAB
ACINETOBACTER CALCOACETICUS/BAUMANNII COMPLEX: NOT DETECTED
BACTEROIDES FRAGILIS: NOT DETECTED
CANDIDA ALBICANS: NOT DETECTED
CANDIDA AURIS: NOT DETECTED
CANDIDA GLABRATA: NOT DETECTED
CANDIDA KRUSEI: NOT DETECTED
CANDIDA PARAPSILOSIS: NOT DETECTED
CANDIDA TROPICALIS: NOT DETECTED
CRYPTOCOCCUS NEOFORMANS/GATTII: NOT DETECTED
CTX-M GENE (ESBL PRODUCER): NOT DETECTED
ENTEROBACTER CLOACAE COMPLEX: NOT DETECTED
ENTEROBACTERALES: ABNORMAL
ENTEROCOCCUS FAECALIS: NOT DETECTED
ENTEROCOCCUS FAECIUM: NOT DETECTED
ESCHERICHIA COLI: DETECTED
HAEMOPHILUS INFLUENZAE: NOT DETECTED
IMP GENE (CARBAPENEM RESISTANT): NOT DETECTED
KLEBSIELLA AEROGENES: NOT DETECTED
KLEBSIELLA OXYTOCA: NOT DETECTED
KLEBSIELLA PNEUMONIAE GROUP: NOT DETECTED
KPC RESISTANCE GENE (CARBAPENEM): NOT DETECTED
LISTERIA MONOCYTOGENES: NOT DETECTED
MCR-1: NOT DETECTED
MEC A/C AND MREJ (MRSA): ABNORMAL
MEC A/C: ABNORMAL
NDM GENE (CARBAPENEM RESISTANT): NOT DETECTED
NEISSERIA MENINGITIDIS: NOT DETECTED
OSMOLALITY SERPL: 305 MOSM/KG (ref 275–295)
OXA-48-LIKE (CARBAPENEM RESISTANT): NOT DETECTED
PROTEUS SPECIES: NOT DETECTED
PSEUDOMONAS AERUGINOSA: NOT DETECTED
SALMONELLA SP: NOT DETECTED
SERRATIA MARCESCENS: NOT DETECTED
STAPHYLOCOCCUS AUREUS: NOT DETECTED
STAPHYLOCOCCUS EPIDERMIDIS: NOT DETECTED
STAPHYLOCOCCUS LUGDUNESIS: NOT DETECTED
STAPHYLOCOCCUS SPECIES: NOT DETECTED
STENOTROPHOMONAS MALTOPHILIA: NOT DETECTED
STREPTOCOCCUS AGALACTIAE: NOT DETECTED
STREPTOCOCCUS PNEUMONIAE: NOT DETECTED
STREPTOCOCCUS PYOGENES: NOT DETECTED
STREPTOCOCCUS SPECIES: NOT DETECTED
VAN A/B (VRE GENE): ABNORMAL
VIM GENE (CARBAPENEM RESISTANT): NOT DETECTED

## 2025-02-19 NOTE — ED PROVIDER NOTES
I received a call from microbiology of the patient's initial blood cultures demonstrated Gram-negative rods x2.  I attempted to call the patient at the phone number ([phone] listed on her chart but was unsuccessful.  Will let other providers now  as well and will attempt to contact patient again as she will likely need to report back to the ER for potential admission.    MD Zeus De Luna Luke G., MD  02/19/25 8332

## 2025-02-19 NOTE — TELEPHONE ENCOUNTER
Patient was called x4 times today regarding Gram-negative blood culture results.  No answer.  Voicemail left x2.  Will send letter.

## 2025-02-20 LAB — BACTERIA UR CULT: NO GROWTH

## 2025-02-21 LAB
BACTERIA BLD CULT: ABNORMAL

## 2025-02-25 PROBLEM — N15.1 RENAL ABSCESS, RIGHT: Status: ACTIVE | Noted: 2025-02-25

## 2025-02-25 PROBLEM — D72.829 LEUKOCYTOSIS: Status: ACTIVE | Noted: 2025-02-25

## 2025-02-28 ENCOUNTER — TELEPHONE (OUTPATIENT)
Dept: PRIMARY CARE CLINIC | Facility: CLINIC | Age: 45
End: 2025-02-28
Payer: MEDICAID

## 2025-02-28 NOTE — TELEPHONE ENCOUNTER
----- Message from Flor sent at 2/28/2025  2:33 PM CST -----  Contact: [facility] willson/[phone]  Caller is requesting an earlier appointment then we can schedule.  Caller is requesting a message be sent to the provider.If this is for urgent care symptoms, did you offer other providers at this location, providers at other locations, or [facility]? (yes, no, n/a):  n/aIf this is for the patients physical, did you offer to schedule next available and put on wait list, or to see NP or PA for their physical?  (yes, no, n/a):  n/aWhen is the next available appointment with their provider:  unavailableReason for the appointment:  hosp f/u Patient preference of timeframe to be scheduled:  with in 7-10 daysWould the patient like a call back, or a response through their [facility] portal?:   phoneComments:  offered an appointment with NP for 03/07/25, but [facility] nurse refused and stated that patient specify that she want to be seen by pcp.

## 2025-03-03 PROBLEM — N12 PYELONEPHRITIS: Status: RESOLVED | Noted: 2025-02-19 | Resolved: 2025-03-03

## 2025-03-03 PROBLEM — D72.829 LEUKOCYTOSIS: Status: RESOLVED | Noted: 2025-02-25 | Resolved: 2025-03-03

## 2025-03-04 ENCOUNTER — NURSE TRIAGE (OUTPATIENT)
Dept: ADMINISTRATIVE | Facility: CLINIC | Age: 45
End: 2025-03-04
Payer: MEDICAID

## 2025-03-04 NOTE — TELEPHONE ENCOUNTER
Reason for Disposition   [1] SEVERE pain (e.g., excruciating, scale 8-10) AND [2] present > 1 hour    Additional Information   Negative: Passed out (e.g., fainted, lost consciousness, blacked out and was not responding)   Negative: Shock suspected (e.g., cold/pale/clammy skin, too weak to stand, low BP, rapid pulse)   Negative: Difficult to awaken or acting confused (e.g., disoriented, slurred speech)   Negative: Sounds like a life-threatening emergency to the triager   Negative: Followed a major injury to the back (e.g., MVA, fall > 10 feet or 3 meters, penetrating injury, etc.)   Negative: Recently diagnosed with a kidney stone attack (renal colic)   Negative: Back pain or flank pain during pregnancy   Negative: Upper, mid or lower back pain that occurs mainly in the midline    Protocols used: Flank Pain-A-AH

## 2025-03-07 ENCOUNTER — RESULTS FOLLOW-UP (OUTPATIENT)
Dept: INFECTIOUS DISEASES | Facility: HOSPITAL | Age: 45
End: 2025-03-07

## 2025-03-07 ENCOUNTER — LAB VISIT (OUTPATIENT)
Dept: LAB | Facility: HOSPITAL | Age: 45
End: 2025-03-07
Attending: INTERNAL MEDICINE
Payer: MEDICAID

## 2025-03-07 ENCOUNTER — OFFICE VISIT (OUTPATIENT)
Dept: INFECTIOUS DISEASES | Facility: CLINIC | Age: 45
End: 2025-03-07
Payer: MEDICAID

## 2025-03-07 VITALS
TEMPERATURE: 98 F | WEIGHT: 247.81 LBS | SYSTOLIC BLOOD PRESSURE: 184 MMHG | BODY MASS INDEX: 41.29 KG/M2 | DIASTOLIC BLOOD PRESSURE: 109 MMHG | HEART RATE: 75 BPM | HEIGHT: 65 IN

## 2025-03-07 DIAGNOSIS — N12 PYELONEPHRITIS: ICD-10-CM

## 2025-03-07 DIAGNOSIS — N15.1 RENAL ABSCESS, RIGHT: ICD-10-CM

## 2025-03-07 DIAGNOSIS — N15.1 RENAL ABSCESS, RIGHT: Primary | ICD-10-CM

## 2025-03-07 DIAGNOSIS — D72.829 LEUKOCYTOSIS, UNSPECIFIED TYPE: ICD-10-CM

## 2025-03-07 DIAGNOSIS — R11.0 NAUSEA IN ADULT: ICD-10-CM

## 2025-03-07 LAB
ANION GAP SERPL CALC-SCNC: 8 MMOL/L (ref 8–16)
BASOPHILS # BLD AUTO: 0.09 K/UL (ref 0–0.2)
BASOPHILS NFR BLD: 1.1 % (ref 0–1.9)
BILIRUB UR QL STRIP: NEGATIVE
BUN SERPL-MCNC: 23 MG/DL (ref 6–20)
CALCIUM SERPL-MCNC: 9.1 MG/DL (ref 8.7–10.5)
CHLORIDE SERPL-SCNC: 99 MMOL/L (ref 95–110)
CLARITY UR REFRACT.AUTO: CLEAR
CO2 SERPL-SCNC: 26 MMOL/L (ref 23–29)
COLOR UR AUTO: YELLOW
CREAT SERPL-MCNC: 0.9 MG/DL (ref 0.5–1.4)
DIFFERENTIAL METHOD BLD: ABNORMAL
EOSINOPHIL # BLD AUTO: 0.2 K/UL (ref 0–0.5)
EOSINOPHIL NFR BLD: 2.2 % (ref 0–8)
ERYTHROCYTE [DISTWIDTH] IN BLOOD BY AUTOMATED COUNT: 12.6 % (ref 11.5–14.5)
EST. GFR  (NO RACE VARIABLE): >60 ML/MIN/1.73 M^2
GLUCOSE SERPL-MCNC: 359 MG/DL (ref 70–110)
GLUCOSE UR QL STRIP: ABNORMAL
HCT VFR BLD AUTO: 38.2 % (ref 37–48.5)
HGB BLD-MCNC: 12.4 G/DL (ref 12–16)
HGB UR QL STRIP: NEGATIVE
IMM GRANULOCYTES # BLD AUTO: 0.03 K/UL (ref 0–0.04)
IMM GRANULOCYTES NFR BLD AUTO: 0.4 % (ref 0–0.5)
KETONES UR QL STRIP: NEGATIVE
LEUKOCYTE ESTERASE UR QL STRIP: NEGATIVE
LYMPHOCYTES # BLD AUTO: 2.1 K/UL (ref 1–4.8)
LYMPHOCYTES NFR BLD: 24.8 % (ref 18–48)
MCH RBC QN AUTO: 28.5 PG (ref 27–31)
MCHC RBC AUTO-ENTMCNC: 32.5 G/DL (ref 32–36)
MCV RBC AUTO: 88 FL (ref 82–98)
MONOCYTES # BLD AUTO: 0.7 K/UL (ref 0.3–1)
MONOCYTES NFR BLD: 8.5 % (ref 4–15)
NEUTROPHILS # BLD AUTO: 5.2 K/UL (ref 1.8–7.7)
NEUTROPHILS NFR BLD: 63 % (ref 38–73)
NITRITE UR QL STRIP: NEGATIVE
NRBC BLD-RTO: 0 /100 WBC
PH UR STRIP: 6 [PH] (ref 5–8)
PLATELET # BLD AUTO: 548 K/UL (ref 150–450)
PMV BLD AUTO: 10.5 FL (ref 9.2–12.9)
POTASSIUM SERPL-SCNC: 3.9 MMOL/L (ref 3.5–5.1)
PROT UR QL STRIP: NEGATIVE
RBC # BLD AUTO: 4.35 M/UL (ref 4–5.4)
SODIUM SERPL-SCNC: 133 MMOL/L (ref 136–145)
SP GR UR STRIP: 1.02 (ref 1–1.03)
URN SPEC COLLECT METH UR: ABNORMAL
UROBILINOGEN UR STRIP-ACNC: NEGATIVE EU/DL
WBC # BLD AUTO: 8.26 K/UL (ref 3.9–12.7)

## 2025-03-07 PROCEDURE — 99999 PR PBB SHADOW E&M-EST. PATIENT-LVL IV: CPT | Mod: PBBFAC,,, | Performed by: INTERNAL MEDICINE

## 2025-03-07 PROCEDURE — 87205 SMEAR GRAM STAIN: CPT | Mod: PN | Performed by: INTERNAL MEDICINE

## 2025-03-07 PROCEDURE — 80048 BASIC METABOLIC PNL TOTAL CA: CPT | Performed by: INTERNAL MEDICINE

## 2025-03-07 PROCEDURE — 3080F DIAST BP >= 90 MM HG: CPT | Mod: CPTII,,, | Performed by: INTERNAL MEDICINE

## 2025-03-07 PROCEDURE — 36415 COLL VENOUS BLD VENIPUNCTURE: CPT | Performed by: INTERNAL MEDICINE

## 2025-03-07 PROCEDURE — 99214 OFFICE O/P EST MOD 30 MIN: CPT | Mod: PBBFAC | Performed by: INTERNAL MEDICINE

## 2025-03-07 PROCEDURE — 3077F SYST BP >= 140 MM HG: CPT | Mod: CPTII,,, | Performed by: INTERNAL MEDICINE

## 2025-03-07 PROCEDURE — 85025 COMPLETE CBC W/AUTO DIFF WBC: CPT | Performed by: INTERNAL MEDICINE

## 2025-03-07 PROCEDURE — 99204 OFFICE O/P NEW MOD 45 MIN: CPT | Mod: S$PBB,,, | Performed by: INTERNAL MEDICINE

## 2025-03-07 PROCEDURE — 1159F MED LIST DOCD IN RCRD: CPT | Mod: CPTII,,, | Performed by: INTERNAL MEDICINE

## 2025-03-07 PROCEDURE — 87040 BLOOD CULTURE FOR BACTERIA: CPT | Performed by: INTERNAL MEDICINE

## 2025-03-07 PROCEDURE — 1111F DSCHRG MED/CURRENT MED MERGE: CPT | Mod: CPTII,,, | Performed by: INTERNAL MEDICINE

## 2025-03-07 PROCEDURE — 3046F HEMOGLOBIN A1C LEVEL >9.0%: CPT | Mod: CPTII,,, | Performed by: INTERNAL MEDICINE

## 2025-03-07 PROCEDURE — 3008F BODY MASS INDEX DOCD: CPT | Mod: CPTII,,, | Performed by: INTERNAL MEDICINE

## 2025-03-07 PROCEDURE — 1160F RVW MEDS BY RX/DR IN RCRD: CPT | Mod: CPTII,,, | Performed by: INTERNAL MEDICINE

## 2025-03-07 PROCEDURE — 81003 URINALYSIS AUTO W/O SCOPE: CPT | Mod: PN | Performed by: INTERNAL MEDICINE

## 2025-03-07 RX ORDER — ONDANSETRON 4 MG/1
8 TABLET, ORALLY DISINTEGRATING ORAL EVERY 8 HOURS PRN
Qty: 28 TABLET | Refills: 0 | Status: SHIPPED | OUTPATIENT
Start: 2025-03-07 | End: 2025-03-14

## 2025-03-07 RX ORDER — CIPROFLOXACIN 500 MG/1
500 TABLET ORAL EVERY 12 HOURS
Qty: 14 TABLET | Refills: 0 | Status: SHIPPED | OUTPATIENT
Start: 2025-03-07 | End: 2025-03-14

## 2025-03-07 NOTE — PROGRESS NOTES
Infectious Disease Clinic  [facility]    Subjective:      Patient ID:Kierra Israel is a 44 y.o. female       Chief Complaint:   Chief Complaint   Patient presents with    Follow-up       History of Present Illness    A 44 y.o. female patient with HTN, DM2, and recent inpatient admission for pyelonephritis with associated bacteremia who is seen for ongoing urinary tract symptoms. Mrs. Israel was admitted to E coli bacteremia secondary to pyelonephritis. Imaging revealed a possible early renal abscess. She was treated with a 10 day course of ceftriaxone and gentamicin. Post discharge, the pharmacy was not able to dispense her antihypertensive or Flomax due to insurance verification. She continues to experience right CVA tenderness however began to experience dysuria, cloudy urine, and difficulty with micturition.       Review of Systems   Constitutional: Positive for chills, decreased appetite and malaise/fatigue.   Eyes:  Positive for blurred vision.   Skin:  Positive for dry skin.   Musculoskeletal:  Positive for back pain and muscle weakness.   Gastrointestinal:  Positive for abdominal pain, nausea and vomiting.   Genitourinary:  Positive for dysuria, flank pain, hematuria, hesitancy and urgency.   Neurological:  Positive for dizziness and headaches.   Psychiatric/Behavioral:  The patient has insomnia and is nervous/anxious.    All other systems reviewed and are negative.      Objective:     Physical Exam  Vitals and nursing note reviewed.   Constitutional:       Appearance: She is well-developed.   HENT:      Head: Normocephalic and atraumatic.      Right Ear: External ear normal.      Left Ear: External ear normal.   Eyes:      General: No scleral icterus.        Right eye: No discharge.         Left eye: No discharge.      Conjunctiva/sclera: Conjunctivae normal.   Cardiovascular:      Rate and Rhythm: Normal rate and regular rhythm.      Heart sounds: Normal heart sounds. No murmur heard.     No  friction rub. No gallop.   Pulmonary:      Effort: Pulmonary effort is normal. No respiratory distress.      Breath sounds: Normal breath sounds. No stridor. No wheezing or rales.   Abdominal:      General: Bowel sounds are normal.      Tenderness: There is right CVA tenderness.   Musculoskeletal:         General: No tenderness. Normal range of motion.   Skin:     General: Skin is warm and dry.   Neurological:      Mental Status: She is alert and oriented to person, place, and time.         Assessment:       ICD-10-CM ICD-9-CM   1. Renal abscess, right  N15.1 590.2   2. Pyelonephritis  N12 590.80   3. Leukocytosis, unspecified type  D72.829 288.60   4. Nausea in adult  R11.0 787.02       Plan:   I have reviewed clinic notes, laboratory, imaging tests, and pathology from the referring provider and other providers, as indicated for this visit, with my interpretation as documented below.     Patient with pyelonephritis and early abscess.  Has recurrent symptoms which unclear if due to progressive infection or residual pain from resolving infection. Will get work up below. Will start antibiotic below while awaiting urine studies.   Orders Placed This Encounter    CULTURE, BLOOD    GRAM STAIN    CBC Auto Differential    Basic Metabolic Panel    Urinalysis, Reflex to Urine Culture Urine, Clean Catch    ciprofloxacin HCl (CIPRO) 500 MG tablet    ondansetron (ZOFRAN-ODT) 4 MG TbDL   If pain does not resolve then will consider repeat CT imaging.   RTC as needed.      The total time for evaluation and management services performed on 3/7/25 was 45 minutes

## 2025-03-07 NOTE — PATIENT INSTRUCTIONS
Start antibiotic (Cipro) after you drop urine sample in lab.   Avoid calcium and mineral containing products 2 hours before and after your antibiotic in order to maximize absorption.

## 2025-03-08 ENCOUNTER — PATIENT MESSAGE (OUTPATIENT)
Dept: INFECTIOUS DISEASES | Facility: HOSPITAL | Age: 45
End: 2025-03-08
Payer: MEDICAID

## 2025-03-08 DIAGNOSIS — I10 HYPERTENSION: ICD-10-CM

## 2025-03-08 LAB — GRAM STN SPEC: NORMAL

## 2025-03-08 RX ORDER — LOSARTAN POTASSIUM AND HYDROCHLOROTHIAZIDE 25; 100 MG/1; MG/1
1 TABLET ORAL DAILY
Qty: 30 TABLET | Refills: 0 | Status: SHIPPED | OUTPATIENT
Start: 2025-03-08

## 2025-03-12 ENCOUNTER — TELEPHONE (OUTPATIENT)
Dept: PRIMARY CARE CLINIC | Facility: CLINIC | Age: 45
End: 2025-03-12
Payer: MEDICAID

## 2025-03-12 LAB — BACTERIA BLD CULT: NORMAL

## 2025-03-12 NOTE — TELEPHONE ENCOUNTER
----- Message from Zuleika sent at 3/12/2025  9:49 AM CDT -----  Contact: Self/[phone]  Caller is requesting an earlier appointment then we can schedule.  Caller is requesting a message be sent to the provider.If this is for urgent care symptoms, did you offer other providers at this location, providers at other locations, or [facility]? (yes, no, n/a):  Yuriy this is for the patients physical, did you offer to schedule next available and put on wait list, or to see NP or PA for their physical?  (yes, no, n/a):  NAWhen is the next available appointment with their provider:  pt is scheduled for today at 10:30 Reason for the appointment:  hospital fu Patient preference of timeframe to be scheduled:  pt is scheduled for today Would the patient like a call back, or a response through their [facility] portal?:   Call back Comments:  pt said that she is calling to let the provider know that she will be about 20 minutes late for her 10:30 appt

## 2025-04-21 ENCOUNTER — PATIENT MESSAGE (OUTPATIENT)
Dept: INFECTIOUS DISEASES | Facility: CLINIC | Age: 45
End: 2025-04-21
Payer: MEDICAID

## 2025-04-21 DIAGNOSIS — I10 HYPERTENSION: ICD-10-CM

## 2025-04-22 RX ORDER — LOSARTAN POTASSIUM AND HYDROCHLOROTHIAZIDE 25; 100 MG/1; MG/1
1 TABLET ORAL DAILY
Qty: 30 TABLET | Refills: 0 | OUTPATIENT
Start: 2025-04-22

## 2025-08-08 ENCOUNTER — TELEPHONE (OUTPATIENT)
Dept: INFECTIOUS DISEASES | Facility: CLINIC | Age: 45
End: 2025-08-08
Payer: MEDICAID

## 2025-08-08 DIAGNOSIS — N12 PYELONEPHRITIS: Primary | ICD-10-CM

## 2025-08-08 NOTE — TELEPHONE ENCOUNTER
Copied from CRM #[id number]. Topic: General Inquiry - Patient Advice  >> Aug 8, 2025 11:34 AM Micaela wrote:  Type: Requesting Call back    Who Called:Jessi Israel      Would the patient rather a call back or a response via [facility]? Call back or [facility]    Best Call Back Number: [phone]    Additional Information:Patient asking to speak to office about questions she has. Patient did not state reason.

## 2025-08-08 NOTE — TELEPHONE ENCOUNTER
Spoke to patients, requested to get a urine test for possible UTI. Complains of fever, chills (last night), flank pain, burning when urinating, frequency, and urgency.

## 2025-08-21 ENCOUNTER — OFFICE VISIT (OUTPATIENT)
Dept: INTERNAL MEDICINE | Facility: CLINIC | Age: 45
End: 2025-08-21
Payer: MEDICAID

## 2025-08-21 VITALS
HEART RATE: 76 BPM | BODY MASS INDEX: 41.43 KG/M2 | OXYGEN SATURATION: 99 % | SYSTOLIC BLOOD PRESSURE: 118 MMHG | HEIGHT: 65 IN | DIASTOLIC BLOOD PRESSURE: 60 MMHG | WEIGHT: 248.69 LBS

## 2025-08-21 DIAGNOSIS — R10.9 RIGHT FLANK PAIN: Primary | ICD-10-CM

## 2025-08-21 PROCEDURE — 87086 URINE CULTURE/COLONY COUNT: CPT

## 2025-08-22 ENCOUNTER — PATIENT MESSAGE (OUTPATIENT)
Dept: INTERNAL MEDICINE | Facility: CLINIC | Age: 45
End: 2025-08-22
Payer: MEDICAID

## 2025-08-22 LAB — BACTERIA UR CULT: NORMAL

## 2025-08-25 ENCOUNTER — HOSPITAL ENCOUNTER (EMERGENCY)
Facility: HOSPITAL | Age: 45
Discharge: HOME | End: 2025-08-25
Attending: EMERGENCY MEDICINE
Payer: MEDICAID

## 2025-08-25 VITALS
SYSTOLIC BLOOD PRESSURE: 177 MMHG | HEIGHT: 65 IN | OXYGEN SATURATION: 95 % | WEIGHT: 238 LBS | DIASTOLIC BLOOD PRESSURE: 89 MMHG | TEMPERATURE: 98 F | BODY MASS INDEX: 39.65 KG/M2 | RESPIRATION RATE: 20 BRPM | HEART RATE: 65 BPM

## 2025-08-25 DIAGNOSIS — R10.9 FLANK PAIN: Primary | ICD-10-CM

## 2025-08-25 DIAGNOSIS — R11.2 NAUSEA AND VOMITING, UNSPECIFIED VOMITING TYPE: ICD-10-CM

## 2025-08-25 DIAGNOSIS — I10 HYPERTENSION: ICD-10-CM

## 2025-08-25 DIAGNOSIS — N28.89 RENAL SCARRING: ICD-10-CM

## 2025-08-25 LAB
ABSOLUTE EOSINOPHIL (OHS): 0.27 K/UL
ABSOLUTE MONOCYTE (OHS): 0.67 K/UL (ref 0.3–1)
ABSOLUTE NEUTROPHIL COUNT (OHS): 9 K/UL (ref 1.8–7.7)
ALBUMIN SERPL BCP-MCNC: 3.7 G/DL (ref 3.5–5.2)
ALP SERPL-CCNC: 111 UNIT/L (ref 40–150)
ALT SERPL W/O P-5'-P-CCNC: 12 UNIT/L (ref 0–55)
ANION GAP (OHS): 11 MMOL/L (ref 8–16)
AST SERPL-CCNC: 18 UNIT/L (ref 0–50)
B-HCG UR QL: NEGATIVE
BACTERIA #/AREA URNS AUTO: ABNORMAL /HPF
BASOPHILS # BLD AUTO: 0.08 K/UL
BASOPHILS NFR BLD AUTO: 0.6 %
BILIRUB SERPL-MCNC: 0.4 MG/DL (ref 0.1–1)
BILIRUB UR QL STRIP.AUTO: NEGATIVE
BUN SERPL-MCNC: 16 MG/DL (ref 6–20)
BUN SERPL-MCNC: 17 MG/DL (ref 6–30)
CALCIUM SERPL-MCNC: 9.2 MG/DL (ref 8.7–10.5)
CHLORIDE SERPL-SCNC: 100 MMOL/L (ref 95–110)
CHLORIDE SERPL-SCNC: 99 MMOL/L (ref 95–110)
CLARITY UR: ABNORMAL
CO2 SERPL-SCNC: 24 MMOL/L (ref 23–29)
COLOR UR AUTO: YELLOW
CREAT SERPL-MCNC: 0.9 MG/DL (ref 0.5–1.4)
CREAT SERPL-MCNC: 0.9 MG/DL (ref 0.5–1.4)
CTP QC/QA: YES
ERYTHROCYTE [DISTWIDTH] IN BLOOD BY AUTOMATED COUNT: 12.8 % (ref 11.5–14.5)
GFR SERPLBLD CREATININE-BSD FMLA CKD-EPI: >60 ML/MIN/1.73/M2
GLUCOSE SERPL-MCNC: 253 MG/DL (ref 70–110)
GLUCOSE SERPL-MCNC: 264 MG/DL (ref 70–110)
GLUCOSE UR QL STRIP: ABNORMAL
HCG INTACT+B SERPL-ACNC: <2.42 MIU/ML
HCT VFR BLD AUTO: 43.9 % (ref 37–48.5)
HCT VFR BLD CALC: 45 %PCV (ref 36–54)
HCV AB SERPL QL IA: NORMAL
HGB BLD-MCNC: 14.7 GM/DL (ref 12–16)
HGB UR QL STRIP: NEGATIVE
HIV 1+2 AB+HIV1 P24 AG SERPL QL IA: NORMAL
HYALINE CASTS UR QL AUTO: 4 /LPF (ref 0–1)
IMM GRANULOCYTES # BLD AUTO: 0.04 K/UL (ref 0–0.04)
IMM GRANULOCYTES NFR BLD AUTO: 0.3 % (ref 0–0.5)
KETONES UR QL STRIP: NEGATIVE
LEUKOCYTE ESTERASE UR QL STRIP: NEGATIVE
LIPASE SERPL-CCNC: 24 U/L (ref 4–60)
LYMPHOCYTES # BLD AUTO: 3.33 K/UL (ref 1–4.8)
MCH RBC QN AUTO: 28.3 PG (ref 27–31)
MCHC RBC AUTO-ENTMCNC: 33.5 G/DL (ref 32–36)
MCV RBC AUTO: 85 FL (ref 82–98)
MICROSCOPIC COMMENT: ABNORMAL
NITRITE UR QL STRIP: NEGATIVE
NUCLEATED RBC (/100WBC) (OHS): 0 /100 WBC
OHS QRS DURATION: 92 MS
OHS QTC CALCULATION: 452 MS
PH UR STRIP: 5 [PH]
PLATELET # BLD AUTO: 362 K/UL (ref 150–450)
PMV BLD AUTO: 11 FL (ref 9.2–12.9)
POC IONIZED CALCIUM: 1.09 MMOL/L (ref 1.06–1.42)
POC TCO2 (MEASURED): 27 MMOL/L (ref 23–29)
POTASSIUM BLD-SCNC: 3.8 MMOL/L (ref 3.5–5.1)
POTASSIUM SERPL-SCNC: 3.8 MMOL/L (ref 3.5–5.1)
PROT SERPL-MCNC: 7.4 GM/DL (ref 6–8.4)
PROT UR QL STRIP: ABNORMAL
RBC # BLD AUTO: 5.19 M/UL (ref 4–5.4)
RBC #/AREA URNS AUTO: 2 /HPF (ref 0–4)
RELATIVE EOSINOPHIL (OHS): 2 %
RELATIVE LYMPHOCYTE (OHS): 24.9 % (ref 18–48)
RELATIVE MONOCYTE (OHS): 5 % (ref 4–15)
RELATIVE NEUTROPHIL (OHS): 67.2 % (ref 38–73)
SAMPLE: ABNORMAL
SODIUM BLD-SCNC: 135 MMOL/L (ref 136–145)
SODIUM SERPL-SCNC: 135 MMOL/L (ref 136–145)
SP GR UR STRIP: >=1.03
SQUAMOUS #/AREA URNS AUTO: 13 /HPF
UROBILINOGEN UR STRIP-ACNC: NEGATIVE EU/DL
WBC # BLD AUTO: 13.39 K/UL (ref 3.9–12.7)
WBC #/AREA URNS AUTO: 6 /HPF (ref 0–5)
YEAST UR QL AUTO: ABNORMAL /HPF

## 2025-08-25 PROCEDURE — 85025 COMPLETE CBC W/AUTO DIFF WBC: CPT | Performed by: EMERGENCY MEDICINE

## 2025-08-25 PROCEDURE — 86803 HEPATITIS C AB TEST: CPT | Performed by: PHYSICIAN ASSISTANT

## 2025-08-25 PROCEDURE — 25000003 PHARM REV CODE 250

## 2025-08-25 PROCEDURE — 63600175 PHARM REV CODE 636 W HCPCS

## 2025-08-25 PROCEDURE — 81001 URINALYSIS AUTO W/SCOPE: CPT | Performed by: EMERGENCY MEDICINE

## 2025-08-25 PROCEDURE — 96374 THER/PROPH/DIAG INJ IV PUSH: CPT

## 2025-08-25 PROCEDURE — 96375 TX/PRO/DX INJ NEW DRUG ADDON: CPT

## 2025-08-25 PROCEDURE — 93005 ELECTROCARDIOGRAM TRACING: CPT

## 2025-08-25 PROCEDURE — 99285 EMERGENCY DEPT VISIT HI MDM: CPT | Mod: 25

## 2025-08-25 PROCEDURE — 82565 ASSAY OF CREATININE: CPT | Performed by: EMERGENCY MEDICINE

## 2025-08-25 PROCEDURE — 93010 ELECTROCARDIOGRAM REPORT: CPT | Mod: ,,, | Performed by: INTERNAL MEDICINE

## 2025-08-25 PROCEDURE — 25500020 PHARM REV CODE 255: Performed by: EMERGENCY MEDICINE

## 2025-08-25 PROCEDURE — 96361 HYDRATE IV INFUSION ADD-ON: CPT

## 2025-08-25 PROCEDURE — 80047 BASIC METABLC PNL IONIZED CA: CPT

## 2025-08-25 PROCEDURE — 63600175 PHARM REV CODE 636 W HCPCS: Performed by: EMERGENCY MEDICINE

## 2025-08-25 PROCEDURE — 83690 ASSAY OF LIPASE: CPT | Performed by: EMERGENCY MEDICINE

## 2025-08-25 PROCEDURE — 87389 HIV-1 AG W/HIV-1&-2 AB AG IA: CPT | Performed by: PHYSICIAN ASSISTANT

## 2025-08-25 PROCEDURE — 84702 CHORIONIC GONADOTROPIN TEST: CPT

## 2025-08-25 PROCEDURE — 81025 URINE PREGNANCY TEST: CPT | Performed by: EMERGENCY MEDICINE

## 2025-08-25 RX ORDER — HYDROMORPHONE HYDROCHLORIDE 1 MG/ML
1 INJECTION, SOLUTION INTRAMUSCULAR; INTRAVENOUS; SUBCUTANEOUS
Refills: 0 | Status: COMPLETED | OUTPATIENT
Start: 2025-08-25 | End: 2025-08-25

## 2025-08-25 RX ORDER — METHOCARBAMOL 500 MG/1
1000 TABLET, FILM COATED ORAL 3 TIMES DAILY
Qty: 30 TABLET | Refills: 0 | Status: SHIPPED | OUTPATIENT
Start: 2025-08-25 | End: 2025-08-30

## 2025-08-25 RX ORDER — HYDROCODONE BITARTRATE AND ACETAMINOPHEN 5; 325 MG/1; MG/1
1 TABLET ORAL EVERY 6 HOURS PRN
Qty: 12 TABLET | Refills: 0 | Status: SHIPPED | OUTPATIENT
Start: 2025-08-25

## 2025-08-25 RX ORDER — DROPERIDOL 2.5 MG/ML
1.25 INJECTION, SOLUTION INTRAMUSCULAR; INTRAVENOUS
Status: COMPLETED | OUTPATIENT
Start: 2025-08-25 | End: 2025-08-25

## 2025-08-25 RX ORDER — LOSARTAN POTASSIUM 50 MG/1
100 TABLET ORAL DAILY
Status: DISCONTINUED | OUTPATIENT
Start: 2025-08-25 | End: 2025-08-25 | Stop reason: HOSPADM

## 2025-08-25 RX ORDER — ONDANSETRON HYDROCHLORIDE 2 MG/ML
8 INJECTION, SOLUTION INTRAVENOUS
Status: COMPLETED | OUTPATIENT
Start: 2025-08-25 | End: 2025-08-25

## 2025-08-25 RX ORDER — MORPHINE SULFATE 2 MG/ML
6 INJECTION, SOLUTION INTRAMUSCULAR; INTRAVENOUS
Refills: 0 | Status: COMPLETED | OUTPATIENT
Start: 2025-08-25 | End: 2025-08-25

## 2025-08-25 RX ORDER — KETOROLAC TROMETHAMINE 30 MG/ML
10 INJECTION, SOLUTION INTRAMUSCULAR; INTRAVENOUS
Status: COMPLETED | OUTPATIENT
Start: 2025-08-25 | End: 2025-08-25

## 2025-08-25 RX ORDER — LIDOCAINE 50 MG/G
1 PATCH TOPICAL DAILY
Qty: 14 PATCH | Refills: 0 | Status: SHIPPED | OUTPATIENT
Start: 2025-08-25 | End: 2025-09-08

## 2025-08-25 RX ADMIN — LOSARTAN POTASSIUM 100 MG: 50 TABLET, FILM COATED ORAL at 02:08

## 2025-08-25 RX ADMIN — IOHEXOL 100 ML: 350 INJECTION, SOLUTION INTRAVENOUS at 06:08

## 2025-08-25 RX ADMIN — ONDANSETRON 8 MG: 2 INJECTION INTRAMUSCULAR; INTRAVENOUS at 01:08

## 2025-08-25 RX ADMIN — DROPERIDOL 1.25 MG: 2.5 INJECTION, SOLUTION INTRAMUSCULAR; INTRAVENOUS at 05:08

## 2025-08-25 RX ADMIN — MORPHINE SULFATE 6 MG: 2 INJECTION, SOLUTION INTRAMUSCULAR; INTRAVENOUS at 01:08

## 2025-08-25 RX ADMIN — SODIUM CHLORIDE, POTASSIUM CHLORIDE, SODIUM LACTATE AND CALCIUM CHLORIDE 1000 ML: 600; 310; 30; 20 INJECTION, SOLUTION INTRAVENOUS at 01:08

## 2025-08-25 RX ADMIN — KETOROLAC TROMETHAMINE 10 MG: 30 INJECTION, SOLUTION INTRAMUSCULAR; INTRAVENOUS at 03:08

## 2025-08-25 RX ADMIN — HYDROMORPHONE HYDROCHLORIDE 1 MG: 1 INJECTION, SOLUTION INTRAMUSCULAR; INTRAVENOUS; SUBCUTANEOUS at 03:08

## 2025-08-26 ENCOUNTER — TELEPHONE (OUTPATIENT)
Dept: INTERNAL MEDICINE | Facility: CLINIC | Age: 45
End: 2025-08-26
Payer: MEDICAID

## 2025-08-26 LAB — HOLD SPECIMEN: NORMAL

## 2025-08-28 LAB — HOLD SPECIMEN: NORMAL
